# Patient Record
Sex: MALE | Race: WHITE | NOT HISPANIC OR LATINO | Employment: FULL TIME | URBAN - METROPOLITAN AREA
[De-identification: names, ages, dates, MRNs, and addresses within clinical notes are randomized per-mention and may not be internally consistent; named-entity substitution may affect disease eponyms.]

---

## 2021-03-04 LAB — COLOGUARD RESULT REPORTABLE: NEGATIVE

## 2023-04-19 PROBLEM — Z12.5 SCREENING PSA (PROSTATE SPECIFIC ANTIGEN): Status: ACTIVE | Noted: 2023-04-19

## 2023-04-19 PROBLEM — Z12.11 SCREENING FOR MALIGNANT NEOPLASM OF COLON: Status: ACTIVE | Noted: 2023-04-19

## 2023-04-19 PROBLEM — N52.9 ERECTILE DYSFUNCTION: Status: ACTIVE | Noted: 2023-04-19

## 2023-04-19 PROBLEM — Z23 IMMUNIZATION DUE: Status: ACTIVE | Noted: 2023-04-19

## 2023-04-19 PROBLEM — Z00.00 WELLNESS EXAMINATION: Status: ACTIVE | Noted: 2023-04-19

## 2023-04-19 PROBLEM — Z11.59 ENCOUNTER FOR HEPATITIS C SCREENING TEST FOR LOW RISK PATIENT: Status: ACTIVE | Noted: 2023-04-19

## 2023-04-19 PROBLEM — Z11.4 SCREENING FOR HIV (HUMAN IMMUNODEFICIENCY VIRUS): Status: ACTIVE | Noted: 2023-04-19

## 2023-04-19 PROBLEM — R53.83 OTHER FATIGUE: Status: ACTIVE | Noted: 2023-04-19

## 2023-04-25 ENCOUNTER — OFFICE VISIT (OUTPATIENT)
Age: 53
End: 2023-04-25

## 2023-04-25 ENCOUNTER — PATIENT OUTREACH (OUTPATIENT)
Age: 53
End: 2023-04-25

## 2023-04-25 VITALS
HEIGHT: 73 IN | HEART RATE: 75 BPM | DIASTOLIC BLOOD PRESSURE: 88 MMHG | BODY MASS INDEX: 35.25 KG/M2 | SYSTOLIC BLOOD PRESSURE: 136 MMHG | WEIGHT: 266 LBS | OXYGEN SATURATION: 97 %

## 2023-04-25 DIAGNOSIS — I10 ESSENTIAL HYPERTENSION: ICD-10-CM

## 2023-04-25 DIAGNOSIS — E11.9 TYPE 2 DIABETES MELLITUS WITHOUT COMPLICATION, WITHOUT LONG-TERM CURRENT USE OF INSULIN (HCC): Primary | ICD-10-CM

## 2023-04-25 LAB — SL AMB POCT HGB: 6.7

## 2023-04-25 NOTE — ASSESSMENT & PLAN NOTE
Pt here today to follow-up on lab  All labs reviewed with patient, and patient was made aware that his fasting blood glucose was 150  POCT Hgb [id number] is 6 7, which places pt in diabetic range  Pt is apprehensive to start medications, however discussed risks of uncontrolled diabetes with pt, and pt agreeable to start metformin at this time  Discussed use and S/E with pt, including possible GI upset  Pt spoke with complex care manager today  Discussed diabetic diet and exercise with pt  Pt was referred to clinical pharmacist to discuss medication options  Pt should start on a low dose statin, however will address at next apt, do to medication apprehension, if not sooner by clinical pharmacist  Pt verbalized understanding and is in agreement with plan  Follow-up in three months for A1C and microalbumin urine  Discussed importance of diabetic foot exams and diabetic eye exams       Hgb A1c 6 7

## 2023-04-25 NOTE — PROGRESS NOTES
Patient was seen in the office for a follow up visit from his initial visit 4/19 in which he was first referred to care management  Based on his A1c during this visit he was diagnosed with Type II diabetes  I met with the patient in person to review diabetic education with him in regards to nutrition  In order to not overwhelm the patient I started with what he drinks in a typical day  Patient stated he has 20oz of coffee and 32 oz of diet ice tea with about 2 bottles of water  Until next outreach I would like patient to increase water to 5 bottles, cut coffee to 12 oz and tea to 16oz  The goal is to go to 12oz of a caffienated beverage a day and 5 bottles of water  MyChart message sent to patient to open communication portal  Next outreach 5/2

## 2023-04-25 NOTE — PROGRESS NOTES
Name: Gregorio Johnson      : 1970      MRN: [id number]  Encounter Provider: CHRISTOPHER Loera  Encounter Date: 2023   Encounter department: [address] [place] [address] WITH [facility]    Assessment & Plan     1  Type 2 diabetes mellitus without complication, without long-term current use of insulin ([facility] [address] )  Assessment & Plan:  Pt here today to follow-up on lab  All labs reviewed with patient, and patient was made aware that his fasting blood glucose was 150  POCT Hgb [id number] is 6 7, which places pt in diabetic range  Pt is apprehensive to start medications, however discussed risks of uncontrolled diabetes with pt, and pt agreeable to start metformin at this time  Discussed use and S/E with pt, including possible GI upset  Pt spoke with complex care manager today  Discussed diabetic diet and exercise with pt  Pt was referred to clinical pharmacist to discuss medication options  Pt should start on a low dose statin, however will address at next apt, do to medication apprehension, if not sooner by clinical pharmacist  Pt verbalized understanding and is in agreement with plan  Follow-up in three months for A1C and microalbumin urine  Discussed importance of diabetic foot exams and diabetic eye exams  Hgb A1c 6 7    Orders:  -     POCT hemoglobin fingerstick  -     metFORMIN (GLUCOPHAGE) 500 mg tablet; Take 1 tablet (500 mg total) by mouth 2 (two) times a day with meals  -     Ambulatory referral to clinical pharmacy; Future    2  Essential hypertension  Assessment & Plan:  Discussed low salt diet, increasing exercise to 30 mins 3-4x a week  Check home BP and record for next visit  BP goal <140/90  Discussed ER precautions for chest pain, stroke precautions, or BP of 180/120 or greater (hypertensive crisis), change in LOC or worsening symptoms  Call with new or worsening symptoms     If you have numbness, tingling, weakness, or severe headache with elevated BP go to the "ED                 Subjective      Pt is here today to follow-up on labs  Reviewed labs with pt today  Pt was made aware that his fasting blood glucose was 150  Hgb A1C poct done today which showed 6 7  Pt reports he is hesitant to start medications for diabetes  He does note his sister is type 2 diabetic  Pt does admit to poor diet, especially over the winter  Pt would like to work on diet and start riding his bike outdoors now that the weather is nicer  Review of Systems   Constitutional: Negative  HENT: Negative  Eyes: Negative  Respiratory: Negative  Cardiovascular: Negative  Gastrointestinal: Negative  Genitourinary: Negative  Musculoskeletal: Negative  Neurological: Negative  Psychiatric/Behavioral: Negative  Current Outpatient Medications on File Prior to Visit   Medication Sig   • losartan (COZAAR) 100 MG tablet Take 1 tablet (100 mg total) by mouth daily   • [DISCONTINUED] ibuprofen (MOTRIN) 200 mg tablet Take 400 mg by mouth every 6 (six) hours as needed       Objective     /88 (BP Location: Right arm, Patient Position: Sitting, Cuff Size: Large)   Pulse 75   Ht 6' 1\" (1 854 m)   Wt 121 kg (266 lb)   SpO2 97%   BMI 35 09 kg/m²     Physical Exam  Vitals and nursing note reviewed  Constitutional:       General: He is not in acute distress  Appearance: Normal appearance  He is not ill-appearing or toxic-appearing  HENT:      Head: Normocephalic and atraumatic  Right Ear: External ear normal       Left Ear: External ear normal       Nose: Nose normal    Eyes:      General:         Right eye: No discharge  Left eye: No discharge  Conjunctiva/sclera: Conjunctivae normal       Pupils: Pupils are equal, round, and reactive to light  Cardiovascular:      Rate and Rhythm: Normal rate and regular rhythm  Heart sounds: Normal heart sounds  Pulmonary:      Effort: Pulmonary effort is normal       Breath sounds: Normal breath sounds   " Abdominal:      General: Bowel sounds are normal       Palpations: Abdomen is soft  Musculoskeletal:         General: Normal range of motion  Cervical back: Neck supple  Right lower leg: No edema  Left lower leg: No edema  Skin:     General: Skin is warm and dry  Neurological:      Mental Status: He is alert and oriented to person, place, and time     Psychiatric:         Mood and Affect: Mood normal        HaoCHRISTOPHER Crawley

## 2023-04-25 NOTE — ASSESSMENT & PLAN NOTE
Discussed low salt diet, increasing exercise to 30 mins 3-4x a week  Check home BP and record for next visit  BP goal <140/90  Discussed ER precautions for chest pain, stroke precautions, or BP of 180/120 or greater (hypertensive crisis), change in LOC or worsening symptoms  Call with new or worsening symptoms  If you have numbness, tingling, weakness, or severe headache with elevated BP go to the ED

## 2023-04-25 NOTE — PATIENT INSTRUCTIONS
Diabetes and Nutrition   WHAT YOU NEED TO KNOW:   Nutrition plans help keep blood sugar levels steady  They also help delay or prevent complications of diabetes, such as diabetic kidney disease  DISCHARGE INSTRUCTIONS:   Call your local emergency number (911 in the [address]) if:   You have any of the following signs of a heart attack:      Squeezing, pressure, or pain in your chest    You may  also have any of the following:     Discomfort or pain in your back, neck, jaw, stomach, or arm    Shortness of breath    Nausea or vomiting    Lightheadedness or a sudden cold sweat      Seek care immediately if:   You have a low blood sugar level and it does not improve with treatment  Symptoms are trouble thinking, a pounding heartbeat, and sweating  Your blood sugar level is above 240 mg/dL and does not come down within 15 minutes of treatment  You have ketones in your blood or urine  You have nausea or are vomiting and cannot keep any food or liquid down  You have blurred or double vision  Your breath has a fruity, sweet smell, or your breathing is shallow  Call your doctor or diabetes care team if:   Your blood sugar levels are higher than your target goals  You often have low blood sugar levels  You have trouble coping with diabetes, or you feel anxious or depressed  You have questions or concerns about your condition or care  A dietitian will help you create a nutrition plan  to meet your needs and your family's needs  He or she may explain a plan such as the Dietary Approaches to Stop Hypertension (DASH) eating plan or the Mediterranean diet  The goal is for you to reach and maintain healthy weight, blood sugar, blood pressure, and lipid levels  You should meet with the dietitian at least 1 time each year  You will learn the following:   How food affects your blood sugar levels    How to create healthy eating habits    How to make food choices based on your activity level, weight, and glucose levels    How your favorite foods may fit into your plan    How to keep track of carbohydrates    Correct portion sizes for each food    Changes you can make to your plan if you get pregnant or are breastfeeding    What you can do before you meet with the dietitian:   Do not skip meals  The goal is to keep your blood sugar level steady  Blood sugar levels may drop too low if you have received insulin and do not eat  Eat more high-fiber foods  Examples include fresh or frozen fruits and vegetables, whole-grain breads, and beans  Fiber helps control or lower blood sugar and cholesterol levels  Choose whole fruits instead of fruit juice as much as possible  Sugar may be added to juice, and fiber may be removed  Choose heart-healthy fats  Foods high in heart-healthy fats include olive oil, nuts, avocados, and fatty fish, such as salmon and tuna  Foods high in unhealthy fats include red meat, full-fat dairy products, and soft margarine  Unhealthy fats can increase your risk for heart disease, increase bad cholesterol, and lower good cholesterol  Choose complex carbohydrates  Foods with complex carbohydrates include brown rice, whole-grain breads and cereals, and cooked beans  Foods with simple carbohydrates include white bread, white rice, most cold cereals, and snack foods  Your plan will include the amount of carbohydrate to have at one time or in a day  Your blood sugar level can get too high if you eat too much carbohydrate at one time  Blood sugar levels do not spike as high or drop as quickly with complex carbohydrates as with simple carbohydrates  Choose complex carbohydrates whenever possible  Have less sodium (salt)  The risk for high blood pressure (BP) increases with high-sodium foods  Limit high-sodium foods, such as soy sauce, potato chips, and canned soup  Do not add salt to food you cook  Limit your use of table salt   Read labels to have no more than 2,300 milligrams of sodium in one day  Limit artificial sweeteners  These may be found in food or drinks, such as diet soft drinks or other low-calorie beverages  Artificial sweeteners are low in calories  They may help you lower your overall calories and carbohydrates  It is important not to have more calories from other foods to make up for the calories saved  Artificial sweeteners do not have any nutrition  Eat whole foods and drink water as much as possible  Your plan may include beverages with artificial sweeteners for a short time  These can help you transition from high-sugar beverages to water  Use the plate method for each meal   This method can help you eat the right amount of carbohydrates and keep your blood sugar levels under control  Draw an imaginary line down the middle of a 9-inch dinner plate  On one side, draw another line to divide that section in half  Your plate will have one large section and 2 small sections  Fill the largest section with non-starchy vegetables  These include broccoli, spinach, cucumbers, peppers, cauliflower, and tomatoes  Add a starch to one of the small sections  Starches include pasta, rice, whole-grain bread, tortillas, corn, potatoes, and beans  Add meat or another source of protein to the other small section  Examples include chicken or turkey without skin, fish, lean beef or pork, low-fat cheese, tofu, and eggs  Add dairy products or fruit next to your plate if your meal plan allows  Examples of dairy include skim or 1% milk and low-fat yogurt  If you do not drink milk or eat dairy products, you may be able to add another serving of starchy food instead  Have a low-calorie or calorie-free drink with your meal   Examples include water or unsweetened tea or coffee  Reach and maintain a healthy weight:  A healthy weight can help you control your diabetes  You can maintain a healthy weight with a nutrition plan and regular physical activity   Ask your healthcare provider what a healthy weight is for you  Ask him or her to help you create a weight loss plan, if needed  Together you can set weight loss and maintenance goals  For example, your goal may be to lose at least 7% of your extra weight in the first 6 months  What you need to know if you choose to drink alcohol:   Alcohol can cause health problems  Alcohol can cause hypoglycemia (very low blood sugar level), especially if you use insulin  Alcohol can cause high blood sugar and BP levels, and weight gain if you drink too much  Hypoglycemia can happen hours after you drink alcohol  Check your blood sugar level for several hours after you drink alcohol  Have a source of fast-acting carbohydrates with you in case your level goes too low  You need immediate care if you have signs or symptoms of hypoglycemia, such as sweating, confusion, or fainting  Limit alcohol as directed  Your healthcare provider can tell you how many drinks are okay for you within 24 hours or within 1 week  Women 21 years or older and men 72 years or older should limit alcohol to 1 drink a day  Men aged 24 to 59 years should limit alcohol to 2 drinks a day  A drink of alcohol is 12 ounces of beer, 5 ounces of wine, or 1½ ounces of liquor  Always have food when you drink alcohol  Your blood sugar may fall to a low level if you drink when your stomach is empty  Follow up with your doctor or diabetes team as directed:  Write down your questions so you remember to ask them during your visits  © Copyright [employer] 2022 Information is for End User's use only and may not be sold, redistributed or otherwise used for commercial purposes  The above information is an  only  It is not intended as medical advice for individual conditions or treatments  Talk to your doctor, nurse or pharmacist before following any medical regimen to see if it is safe and effective for you    Diabetes and Exercise   WHAT YOU NEED TO KNOW:   Physical activity, such as exercise, can help keep your blood sugar level steady or improve insulin resistance  Activity can help decrease your risk for heart disease, and help you lose weight  Exercise can also help lower your A1c or keep it at goal  Your diabetes care team will help you create an exercise plan  The plan will be based on the type of diabetes you have and your starting fitness level  DISCHARGE INSTRUCTIONS:   Call your local emergency number (911 in the [address] [facility]) if:   You have chest pain or shortness of breath  Return to the emergency department if:   You have a low blood sugar level and it does not improve with treatment  Symptoms are trouble thinking, a pounding heartbeat, and sweating  Your blood sugar level is above 240 mg/dL and does not come down within 15 minutes of treatment  You have blurred or double vision  Your breath has a fruity, sweet smell, or your breathing is shallow  Call your doctor or diabetes care team if:   You have ketones in your blood or urine  You have a fever  Your blood sugar levels are higher than your target goals  You often have low blood sugar levels  Your skin is red, dry, warm, or swollen  You have a wound that does not heal     You have trouble coping with diabetes, or you feel anxious or depressed  You have questions or concerns about your condition or care  Tips to help you create and meet your exercise goals:   Set a goal for 150 minutes (2 5 hours) of moderate to vigorous aerobic activity each week  Aerobic activity helps your heart stay strong  Aerobic activity includes walking, bicycling, dancing, swimming, and raking leaves  Spread aerobic activity over 3 to 5 days  Do not take more than 2 days off in a row  It is best to do at least 10 minutes at a time and 30 minutes each day  You can work up to these goals  Remember that any activity is better than no activity  Over time, you can make exercise more intense or last longer   You can also add more days of exercise as your fitness level improves  Your diabetes care team can help you make a step-by-step plan to achieve your goals  Set a strength training goal of 2 to 3 times a week  Take at least 1 day off in between strength training sessions  Strength training helps you keep the muscles you have and build new muscles  Strength training includes lifting weights, climbing stairs, yoga, and clarissa chi          Older adults should include balance training 2 to 3 times each week  These include walking backwards, standing on one foot, and walking heel to toe in a straight line  Other healthy activity tips:   Stretch before and after you exercise to prevent injury  Drink water or liquids that do not contain sugar before, during, and after exercise  Ask your dietitian or healthcare provider which liquids you should drink when you exercise  Do not  sit for longer than 30 minutes at a time during your day  If you cannot walk around, at least stand up  This will help you stay active and keep your blood circulating  Try to be active throughout your day  Exercise and blood sugar levels:  Check your blood sugar level before and after exercise, if you use insulin  Healthcare providers may tell you to change the amount of insulin you take or food you eat  If your blood sugar level is high, check your blood or urine for ketones before you exercise  Do not exercise if your blood sugar level is high and you have ketones  If your blood sugar level is less than 100 mg/dL, have a carbohydrate snack before you exercise  Examples are 4 to 6 crackers, ½ banana, 8 ounces (1 cup) of milk, or 4 ounces (½ cup) of juice  Follow up with your doctor or diabetes care team as directed:  Write down your questions so you remember to ask them during your visits    © Copyright [facility] 2022 Information is for End User's use only and may not be sold, redistributed or otherwise used for commercial purposes  The above information is an  only  It is not intended as medical advice for individual conditions or treatments  Talk to your doctor, nurse or pharmacist before following any medical regimen to see if it is safe and effective for you

## 2023-04-27 ENCOUNTER — CLINICAL SUPPORT (OUTPATIENT)
Age: 53
End: 2023-04-27

## 2023-04-27 DIAGNOSIS — E11.9 TYPE 2 DIABETES MELLITUS WITHOUT COMPLICATION, WITHOUT LONG-TERM CURRENT USE OF INSULIN (HCC): ICD-10-CM

## 2023-04-27 DIAGNOSIS — E78.2 MIXED HYPERLIPIDEMIA: Primary | ICD-10-CM

## 2023-04-27 RX ORDER — ROSUVASTATIN CALCIUM 5 MG/1
5 TABLET, COATED ORAL DAILY
Qty: 30 TABLET | Refills: 3 | Status: SHIPPED | OUTPATIENT
Start: 2023-04-27

## 2023-04-27 RX ORDER — METFORMIN HYDROCHLORIDE 500 MG/1
1000 TABLET, EXTENDED RELEASE ORAL
Qty: 60 TABLET | Refills: 3 | Status: SHIPPED | OUTPATIENT
Start: 2023-04-27

## 2023-04-27 RX ORDER — BLOOD-GLUCOSE SENSOR
1 EACH MISCELLANEOUS
Qty: 2 EACH | Refills: 11 | Status: SHIPPED | OUTPATIENT
Start: 2023-04-27

## 2023-04-27 NOTE — PROGRESS NOTES
[facility]  Krissy Austin, Pharmacist    Otf Ford is a 47 y o  male who was referred to the pharmacist for T2DM education and management, referred by Melody Hernandez   Patient presents for in person appt today  Assessment/ Plan       1  Type 2 Diabetes:  • Goal A1c <7% based on ADA guidelines however can aim for lower goal of <6 5% based on age and new diagnosis of T2DM  Most recent A1c 6 7% (4/25/23)  • Reported Home SMBG  o Not currently checking  • Complications:  o Microvascular complications: None  o Macrovascular complications: None    Changes to Medication Regimen: If PCP is in agreement with plan  • Initiate moderate intensity statin of rosuvastatin 5 mg daily   • Initiate sabine 3 CGM  Pt declined in person training (his sister has a CGM)  I did have him download Sabine 3 gracia in person today and sent email request to share readings with office  • Switch Metformin  mg BID to Metformin  mg 2 tabs once daily to assist with med adherence with once daily dosing  2  On Additional Therapies:  • Statin: No  • ACEI/ARB: Yes  • ASA: No    3  Health Maintenance:  • Eye Exam: Due  • Foot Exam: Due  • Urine Microalbumin: Due    4  Medication Reconciliation:   • Medication list reviewed with pt at today's visit  • Medication list updated to reflect medications pt is currently taking    Monitoring: Sabine 3 CGM    Referrals placed at this visit: None    Follow-up: 4 weeks      Subjective     1  Medication Adherence/ Tolerability:  • Metformin 500 mg BID- denies side effects  took 3rd pill today  He is having a hard time with taking his second dose due to the timing of his meal and needing to bring medication to work with him  • Supplements: takes OTC Magnesium, turmeric, vitamin D, biotin  Takes Turmeric for inflammation which we discussed possibly stopping due to lack of efficacy   Suggested daily multivitamin instead of the other 3 supplements since that would contain magnesium, audrey D, and biotin  • The patient reports missing 0 doses of medication in the past 2 weeks  Medications Tried in Past for DM:  None     2  Lifestyle:   • Working with care manager on diet and lifestyle interventions  3  Home monitoring devices  • Glucometer: No,   • CGM: No, Brand: getting set up with Sabine 3      Objective     No results found for: HGBA1C    Blood Glucose Readings  The patient is currently checking blood glucose 0 times per day  Patient does not report with SMBG logs        ASCVD Risk:  The 10-year ASCVD risk score (Homer RENAE, et al , 2019) is: 10 4%    Values used to calculate the score:      Age: 47 years      Sex: Male      Is Non- : No      Diabetic: Yes      Tobacco smoker: No      Systolic Blood Pressure: 949 mmHg      Is BP treated: Yes      HDL Cholesterol: 44 mg/dL      Total Cholesterol: 183 mg/dL     Vitals: There were no vitals filed for this visit  Labs:    Lab Results   Component Value Date    SODIUM 136 04/19/2023    K 4 4 04/19/2023    EGFR 110 04/19/2023    CREATININE 0 72 04/19/2023         Pharmacist Tracking Tool     Pharmacist Tracking Tool  Reason For Outreach: Embedded Pharmacist  Demographics:  Intervention Method:  In Person  Type of Intervention: New  Topics Addressed: Diabetes and Dyslipidemia  Pharmacologic Interventions: Medication Initiation, Medication Conversion and Med Rec  Non-Pharmacologic Interventions: Adherence addressed, Disease state education, Home Monitoring, Medication/Device education and Personal CGM  Time:  Direct Patient Care: 45 mins  Care Coordination: 15 mins  Recommendation Recipient: Patient/Caregiver and Provider  Outcome: Accepted

## 2023-05-02 ENCOUNTER — PATIENT OUTREACH (OUTPATIENT)
Age: 53
End: 2023-05-02

## 2023-05-02 ENCOUNTER — TELEPHONE (OUTPATIENT)
Dept: ADMINISTRATIVE | Facility: OTHER | Age: 53
End: 2023-05-02

## 2023-05-02 NOTE — TELEPHONE ENCOUNTER
Upon review of the In Basket request we were able to locate, review, and update the patient chart as requested for CRC: Marcos  Any additional questions or concerns should be emailed to the Practice Liaisons via the appropriate education email address, please do not reply via In Basket      Thank you  Danny Diaz

## 2023-05-02 NOTE — TELEPHONE ENCOUNTER
----- Message from Valentino Rule, [place] sent at 5/1/2023  3:59 PM EDT -----  Regarding: Care Gap request  05/01/23 3:59 PM    Hello, our patient attached above has had CRC: Cologuard completed/performed  Please assist in updating the patient chart by pulling the Care Everywhere (CE) document  The date of service is 2021         Thank you,  Valentino Rule [facility]

## 2023-05-02 NOTE — PROGRESS NOTES
Spoke with patient during scheduled outreach  He is down to 10-12 oz of coffee a day  He has cut juices out, but has a tea a dinner  He is lacking still with water  Patient stated he has no desire to drink and is forcing himself  I explained that the more he drinks the better hydrated he will be and happier his body will feel  He doesn't like tap water and was concerned about the cost of bottled water  I suggested buying a gallon jug or larger to fill the little bottles up  I also suggested getting true lemon or other citrus flavors to add to the water to give it some flavor  Patient stated when he went to grocery he was paying more attention to food labels than previous  I suggested downloading the lose it gracia so that we can see what he is eating and where his daily carbs are falling  He said he has been much more aware of what he is eating since leaving the office  Patient stated he has not gotten notification from pharmacy about [employer] sensor  I suggested he reach out to them and if he doesn't get a response to reach out to clinical pharmacist whom he is working with as well  I provided guidelines for tracking and cutting calories by 500 but not below 1500  Patient verbalized understanding   Next outreach 5/16

## 2023-05-04 LAB — SL AMB POCT HEMOGLOBIN AIC: 6.7 (ref ?–6.5)

## 2023-05-15 ENCOUNTER — TELEPHONE (OUTPATIENT)
Age: 53
End: 2023-05-15

## 2023-05-15 NOTE — TELEPHONE ENCOUNTER
Patient called and left message for clinical pharmacist  States he has been having issues with Sabine 3 sticking  First one fell off shortly after putting it on  Second one was on for ~5 days and knocked it loose  He did call [employer] [facility] and got 2 new replacement sensors  He then put a third one on and did use overbandage as we discussed  Unfortunately he had a phone update at that time which took his operating sytem from  IOS 16 to IOS 16 4 which is no longer compatible  I told patient I would reach [facility] to the [facility] to get more information on the update to the operating system  Awaiting response from rep  Otherwise, of the limited data we have his blood sugar readings did appear well controlled             Pharmacist Tracking Tool  Reason For Outreach: Embedded Pharmacist  Demographics:  Intervention Method: Phone  Type of Intervention: Follow-Up  Topics Addressed: Diabetes  Pharmacologic Interventions: N/A  Non-Pharmacologic Interventions: Care coordination, Chart update and Home Monitoring  Time:  Direct Patient Care: 10 mins  Care Coordination: 5 mins  Recommendation Recipient: N/A  Outcome: N/A

## 2023-05-16 ENCOUNTER — PATIENT OUTREACH (OUTPATIENT)
Age: 53
End: 2023-05-16

## 2023-05-16 NOTE — PROGRESS NOTES
Spoke with patient during scheduled outreach  Patient is drinking 4 bottles a day consistently, and I encouraged him to squeeze 1 more in during the day  He did download the lose it gracia which gave him a starting calorie of 2300 although most days it sounded like he was 2235-5080 with occasional 2100  We talked about the gracia and how it works and why I like to set the goals rather than the gracia  He also has a scale somewhere in the house, but can not locate it at this time  I encouraged him to locate it and weigh on Friday mornings  I set his goal for 4225-0271 for now and we will talk about macros next time  He also stated he has been having some diarrhea from the metformin which the clinical pharmacist is aware of and is switching him to the XR next time  He has been having sensor issues and I recommended using skin prep prior to applying which helps not only provide a barrier, but better adhesion to the skin  He has an issue with the software as well so not sure what is going to happen with that  The information that was received showed that he was in the green zone 96% of the time and therefore the change in diet with metformin seem to be effective at this time   Next outreach 5/31

## 2023-05-25 ENCOUNTER — CLINICAL SUPPORT (OUTPATIENT)
Age: 53
End: 2023-05-25

## 2023-05-25 DIAGNOSIS — E11.9 TYPE 2 DIABETES MELLITUS WITHOUT COMPLICATION, WITHOUT LONG-TERM CURRENT USE OF INSULIN (HCC): Primary | ICD-10-CM

## 2023-05-25 RX ORDER — LANCETS 33 GAUGE
EACH MISCELLANEOUS
Qty: 100 EACH | Refills: 3 | Status: SHIPPED | OUTPATIENT
Start: 2023-05-25

## 2023-05-25 RX ORDER — BLOOD-GLUCOSE METER
KIT MISCELLANEOUS
Qty: 1 KIT | Refills: 0 | Status: SHIPPED | OUTPATIENT
Start: 2023-05-25

## 2023-05-25 RX ORDER — BLOOD SUGAR DIAGNOSTIC
STRIP MISCELLANEOUS
Qty: 100 EACH | Refills: 3 | Status: SHIPPED | OUTPATIENT
Start: 2023-05-25

## 2023-05-25 NOTE — PROGRESS NOTES
"[facility]  Jorge Alberto Armstrong, Pharmacist    Danielle Lopez is a 47 y o  male who was referred to the pharmacist for T2DM education and management, referred by [facility]   Telemedicine consent  The patient was identified by name and date of birth  Danielle Lopez was informed that this is a telemedicine visit and that the visit is being conducted through Telephone  My office door was closed  No one else was in the room  He acknowledged consent and understanding of privacy and security of the video platform  The patient has agreed to participate and understands they can discontinue the visit at any time  Assessment/ Plan       1  Type 2 Diabetes:  • Goal A1c <7% based on ADA guidelines however can aim for lower goal of <6 5% based on age and new diagnosis of T2DM  Most recent A1c 6 7% (4/25/23)  • Sabine 3 Report  o Within goal range of  mg/dL 94% of time (aiming for >70% of time)  o Average blood sugar 113 mg/dL  o Occasional lows on Cherry report however patient is not symptomatic when this happens  I do question if these are true low blood sugar events  He plans to verify with fingerstick  • Complications:  o Microvascular complications: None  o Macrovascular complications: None    Changes to Medication Regimen: If PCP is in agreement with plan  • Continue Metformin 1000 mg total daily  Switch to XR when you run out of IR formulation   • Use up last of Sabine 3 sensors at home then patient plans to stop using CGM  He does want a fingerstick glucometer to have on hand as well  • Rx pended for one touch verio  Other: Patient did ask about statin therapy  States that chiropractor told patient that his cholesterol was only mildly elevated and that statin therapy is overall \"not great\"  Denies any side effects to statin  Denies muscle pain    We did discuss risks of statin versus benefits as well as multiple indication for statin therapy for primary " prevention of cardiovascular disease including hx t2dm, elevated cholesterol levels, and elevated 10 year ASCVD risk  Patient was agreeable to continue statin therapy  2  On Additional Therapies:  • Statin: Yes  • ACEI/ARB: Yes  • ASA: No    3  Health Maintenance:  • Eye Exam: Due  • Foot Exam: Due  • Urine Microalbumin: Due    4  Medication Reconciliation:   • Medication list reviewed with pt at today's visit  • Medication list updated to reflect medications pt is currently taking    Monitoring: Sabine 3 CGM    Referrals placed at this visit: None    Follow-up: 8 weeks with PCP ; as needed with clinical pharmacist       Subjective     1  Medication Adherence/ Tolerability:  • Metformin 500 mg BID- denies side effects  Has a few days of the IR tabs left before switching over to XR tablets  • The patient reports missing 0 doses of medication in the past 2 weeks  Medications Tried in Past for DM:  None     2  Lifestyle:   • Working with care manager on diet and lifestyle interventions  3  Home monitoring devices  • Glucometer: No,   • CGM: No, Brand: getting set up with [facility]      Objective     Lab Results   Component Value Date    HGBA1C 6 7 (A) 05/04/2023       Sabine Report          ASCVD Risk:  The 10-year ASCVD risk score (Homer RENAE, et al , 2019) is: 10 4%    Values used to calculate the score:      Age: 47 years      Sex: Male      Is Non- : No      Diabetic: Yes      Tobacco smoker: No      Systolic Blood Pressure: 378 mmHg      Is BP treated: Yes      HDL Cholesterol: 44 mg/dL      Total Cholesterol: 183 mg/dL     Vitals: There were no vitals filed for this visit      Labs:    Lab Results   Component Value Date    CREATININE 0 72 04/19/2023    EGFR 110 04/19/2023    K 4 4 04/19/2023    SODIUM 136 04/19/2023         Pharmacist Tracking Tool     Pharmacist Tracking Tool  Reason For Outreach: Embedded Pharmacist  Demographics:  Intervention Method: Phone  Type of Intervention: Follow-Up  Topics Addressed: Diabetes and Dyslipidemia  Pharmacologic Interventions: Medication Initiation and Med Rec  Non-Pharmacologic Interventions: Adherence addressed, Disease state education, Home Monitoring, Medication/Device education and Personal CGM  Time:  Direct Patient Care: 20 mins  Care Coordination: 15 mins  Recommendation Recipient: Patient/Caregiver and Provider  Outcome: Accepted

## 2023-05-31 ENCOUNTER — PATIENT OUTREACH (OUTPATIENT)
Dept: FAMILY MEDICINE CLINIC | Facility: CLINIC | Age: 53
End: 2023-05-31

## 2023-05-31 NOTE — PROGRESS NOTES
Spoke with patient during scheduled outreach  Patient has been eating between 8500-6821 calories regularly and has lost 22lbs per his scale  He is in the last hole of his belt loop and clothing is very loose  He is very active in his job with lifting and walking a lot during the day  I asked the patient to look at his macros and his fats are around 40%-50% carbs 30% and protein 20-30%  I Talked with him about the importance of still having enough carbs in the diet even with diabetes  Patient stated that he felt overwhelmed with needing to make an additional change  I asked the patient if he could come in and meet with me to review his food journal so we could talk more about what he is eating and find ways to tweak it  I asked him to stay where he is with calories and we schedule next outreach to be in person 6/12  Patient verbalized understanding

## 2023-06-12 ENCOUNTER — PATIENT OUTREACH (OUTPATIENT)
Age: 53
End: 2023-06-12

## 2023-06-12 NOTE — PROGRESS NOTES
Patient came in the office with me to review his food journal  Patient is typically between 30-50% on fat, 20% protein and 30-50% on carbs  We went through several days of his food tracking and talked about the foods that need adjusting  He is currently eating a lot of red meat, sweets and cheese  They also got a keto pancake mix that is high fat  We talked about still needing carbs in the diet even though he is diabetic and I gave him a book that has an overview of eating as a diabetic  His goal is to go off the metformin and be diet controlled  I suggested if that is his goal that he needs to put more thought into what he is eating in a day  Grab and go won't work right now if he isn't grabbing the right food items  Patient does have an active job and he and his wife have started biking regularly for exercise  Patient said he is having pain in the ball of his foot  I recommended he follow up with a podiatrist which is part of diabetic care as well  CGM only worked for about 2 wks then he was having issues due to the update  Patient verbalized understanding  Nex outreach 7/10 unless he would like to talk before

## 2023-06-18 PROBLEM — Z12.5 SCREENING PSA (PROSTATE SPECIFIC ANTIGEN): Status: RESOLVED | Noted: 2023-04-19 | Resolved: 2023-06-18

## 2023-06-18 PROBLEM — Z12.11 SCREENING FOR MALIGNANT NEOPLASM OF COLON: Status: RESOLVED | Noted: 2023-04-19 | Resolved: 2023-06-18

## 2023-07-07 ENCOUNTER — TELEPHONE (OUTPATIENT)
Age: 53
End: 2023-07-07

## 2023-07-07 DIAGNOSIS — I10 ESSENTIAL HYPERTENSION: ICD-10-CM

## 2023-07-07 DIAGNOSIS — E11.9 TYPE 2 DIABETES MELLITUS WITHOUT COMPLICATION, WITHOUT LONG-TERM CURRENT USE OF INSULIN (HCC): Primary | ICD-10-CM

## 2023-07-07 DIAGNOSIS — E78.2 MIXED HYPERLIPIDEMIA: ICD-10-CM

## 2023-07-07 RX ORDER — BLOOD SUGAR DIAGNOSTIC
STRIP MISCELLANEOUS
Qty: 100 EACH | Refills: 3 | Status: SHIPPED | OUTPATIENT
Start: 2023-07-07

## 2023-07-07 RX ORDER — ROSUVASTATIN CALCIUM 5 MG/1
5 TABLET, COATED ORAL DAILY
Qty: 90 TABLET | Refills: 3 | Status: SHIPPED | OUTPATIENT
Start: 2023-07-07

## 2023-07-07 RX ORDER — METFORMIN HYDROCHLORIDE 500 MG/1
1000 TABLET, EXTENDED RELEASE ORAL
Qty: 180 TABLET | Refills: 3 | Status: SHIPPED | OUTPATIENT
Start: 2023-07-07

## 2023-07-07 RX ORDER — LANCETS 33 GAUGE
EACH MISCELLANEOUS
Qty: 100 EACH | Refills: 3 | Status: SHIPPED | OUTPATIENT
Start: 2023-07-07

## 2023-07-07 RX ORDER — LOSARTAN POTASSIUM 100 MG/1
100 TABLET ORAL DAILY
Qty: 90 TABLET | Refills: 3 | Status: SHIPPED | OUTPATIENT
Start: 2023-07-07

## 2023-07-07 NOTE — TELEPHONE ENCOUNTER
[address]  [place], PharmD, BCPS, BCACP     Covering for Cathie Padron PharmD while out on leave. Received InBasket message from patient. Patient requests that [facility]. Would like 90-day supply of medications. Will pend prescriptions for PCP.

## 2023-07-11 ENCOUNTER — PATIENT OUTREACH (OUTPATIENT)
Age: 53
End: 2023-07-11

## 2023-07-11 NOTE — PROGRESS NOTES
Spoke with patient during scheduled outreach call. He is down 30lbs and is feeling good. He also feels like he is looking good which has boosted his confidence to continue. We talked about making good food choices when eating out. He also mentioned that his children have starting following along eating. His one son who is 24 is all but 400lbs and he is concerned about him. He would like him to come into the office for help, but he wants to lose some weight before he comes in. He also talked to me about food substitutes they have been finding. This is going very smooth as a family. Patient has in office appointment 7/25 at that time I will meet with him and put him into surveillance pending his A1c.

## 2023-07-17 ENCOUNTER — RA CDI HCC (OUTPATIENT)
Dept: OTHER | Facility: HOSPITAL | Age: 53
End: 2023-07-17

## 2023-07-17 NOTE — PROGRESS NOTES
[address] [facility] coding opportunities       Chart reviewed, no opportunity found: CHART REVIEWED, NO OPPORTUNITY FOUND        Patients Insurance        Commercial Insurance: Kamari Pina

## 2023-07-25 ENCOUNTER — OFFICE VISIT (OUTPATIENT)
Age: 53
End: 2023-07-25

## 2023-07-25 VITALS
HEIGHT: 73 IN | DIASTOLIC BLOOD PRESSURE: 72 MMHG | TEMPERATURE: 98.6 F | OXYGEN SATURATION: 95 % | HEART RATE: 57 BPM | BODY MASS INDEX: 31.49 KG/M2 | WEIGHT: 237.6 LBS | SYSTOLIC BLOOD PRESSURE: 128 MMHG

## 2023-07-25 DIAGNOSIS — E66.09 CLASS 1 OBESITY DUE TO EXCESS CALORIES WITHOUT SERIOUS COMORBIDITY WITH BODY MASS INDEX (BMI) OF 30.0 TO 30.9 IN ADULT: ICD-10-CM

## 2023-07-25 DIAGNOSIS — E11.9 TYPE 2 DIABETES MELLITUS WITHOUT COMPLICATION, WITHOUT LONG-TERM CURRENT USE OF INSULIN (HCC): Primary | ICD-10-CM

## 2023-07-25 DIAGNOSIS — I10 ESSENTIAL HYPERTENSION: ICD-10-CM

## 2023-07-25 PROBLEM — Z23 IMMUNIZATION DUE: Status: RESOLVED | Noted: 2023-04-19 | Resolved: 2023-07-25

## 2023-07-25 PROBLEM — Z00.00 WELLNESS EXAMINATION: Status: RESOLVED | Noted: 2023-04-19 | Resolved: 2023-07-25

## 2023-07-25 PROBLEM — Z11.59 ENCOUNTER FOR HEPATITIS C SCREENING TEST FOR LOW RISK PATIENT: Status: RESOLVED | Noted: 2023-04-19 | Resolved: 2023-07-25

## 2023-07-25 PROBLEM — Z11.4 SCREENING FOR HIV (HUMAN IMMUNODEFICIENCY VIRUS): Status: RESOLVED | Noted: 2023-04-19 | Resolved: 2023-07-25

## 2023-07-25 LAB — SL AMB POCT HEMOGLOBIN AIC: 6 (ref ?–6.5)

## 2023-07-25 PROCEDURE — 99213 OFFICE O/P EST LOW 20 MIN: CPT | Performed by: NURSE PRACTITIONER

## 2023-07-25 PROCEDURE — 83036 HEMOGLOBIN GLYCOSYLATED A1C: CPT | Performed by: NURSE PRACTITIONER

## 2023-07-25 NOTE — PATIENT INSTRUCTIONS
Diabetes and Nutrition   AMBULATORY CARE:   Nutrition plans  help keep blood sugar levels steady. They also help delay or prevent complications of diabetes, such as diabetic kidney disease. Call your local emergency number (911 in the [address]) if:   You have any of the following signs of a heart attack:      Squeezing, pressure, or pain in your chest    You may  also have any of the following:     Discomfort or pain in your back, neck, jaw, stomach, or arm    Shortness of breath    Nausea or vomiting    Lightheadedness or a sudden cold sweat      Seek care immediately if:   You have a low blood sugar level and it does not improve with treatment. Symptoms are trouble thinking, a pounding heartbeat, and sweating. Your blood sugar level is above 240 mg/dL and does not come down within 15 minutes of treatment. You have ketones in your blood or urine. You have nausea or are vomiting and cannot keep any food or liquid down. You have blurred or double vision. Your breath has a fruity, sweet smell, or your breathing is shallow. Call your doctor or diabetes care team if:   Your blood sugar levels are higher than your target goals. You often have low blood sugar levels. You have trouble coping with diabetes, or you feel anxious or depressed. You have questions or concerns about your condition or care. A dietitian will help you create a nutrition plan  to meet your needs and your family's needs. He or she may explain a plan such as the Dietary Approaches to Stop Hypertension (DASH) eating plan or the Mediterranean diet. The goal is for you to reach or maintain healthy weight, blood sugar, blood pressure, and lipid levels. You should meet with the dietitian at least 1 time each year. You will learn the following:   How food affects your blood sugar levels    How to create healthy eating habits    How to make food choices based on your activity level, weight, and glucose levels    How your favorite foods may fit into your plan    How to keep track of carbohydrates    Correct portion sizes for each food    Changes you can make to your plan if you get pregnant or are breastfeeding    What you can do before you meet with the dietitian:   Do not skip meals. The goal is to keep your blood sugar level steady. Blood sugar levels may drop too low if you have received insulin and do not eat. Eat more high-fiber foods. Examples include fresh or frozen fruits and vegetables, whole-grain breads, and beans. Fiber helps control or lower blood sugar and cholesterol levels. Choose whole fruits instead of fruit juice as much as possible. Sugar may be added to juice, and fiber may be removed. Choose heart-healthy fats. Foods high in heart-healthy fats include olive oil, nuts, avocados, and fatty fish, such as salmon and tuna. Foods high in unhealthy fats include red meat, full-fat dairy products, and soft margarine. Unhealthy fats can increase your risk for heart disease, increase bad cholesterol, and lower good cholesterol. Choose complex carbohydrates. Foods with complex carbohydrates include brown rice, whole-grain breads and cereals, and cooked beans. Foods with simple carbohydrates include white bread, white rice, most cold cereals, and snack foods. Your plan will include the amount of carbohydrate to have at one time or in a day. Your blood sugar level can get too high if you eat too much carbohydrate at one time. Blood sugar levels do not spike as high or drop as quickly with complex carbohydrates as with simple carbohydrates. Choose complex carbohydrates whenever possible. Have less sodium (salt). The risk for high blood pressure (BP) increases with high-sodium foods. Limit high-sodium foods, such as soy sauce, potato chips, and canned soup. Do not add salt to food you cook. Limit your use of table salt. Read labels to have no more than 2,300 milligrams of sodium in one day.          Limit artificial sweeteners. These may be found in food or drinks, such as diet soft drinks or other low-calorie beverages. Artificial sweeteners are low in calories. They may help you lower your overall calories and carbohydrates. It is important not to have more calories from other foods to make up for the calories saved. Artificial sweeteners do not have any nutrition. Eat whole foods and drink water as much as possible. Your plan may include beverages with artificial sweeteners for a short time. These can help you transition from high-sugar beverages to water. Use the plate method for each meal.  This method can help you eat the right amount of carbohydrates and keep your blood sugar levels under control. Draw an imaginary line down the middle of a 9-inch dinner plate. On one side, draw another line to divide that section in half. Your plate will have one large section and 2 small sections. Fill the largest section with non-starchy vegetables. These include broccoli, spinach, cucumbers, peppers, cauliflower, and tomatoes. Add a starch to one of the small sections. Starches include pasta, rice, whole-grain bread, tortillas, corn, potatoes, and beans. Add meat or another source of protein to the other small section. Examples include chicken or turkey without skin, fish, lean beef or pork, low-fat cheese, tofu, and eggs. Add dairy products or fruit next to your plate if your meal plan allows. Examples of dairy include skim or 1% milk and low-fat yogurt. If you do not drink milk or eat dairy products, you may be able to add another serving of starchy food instead. Have a low-calorie or calorie-free drink with your meal.  Examples include water or unsweetened tea or coffee. Reach and maintain a healthy weight:  A healthy weight can help you control your diabetes. You can maintain a healthy weight with a nutrition plan and regular physical activity.  Ask your healthcare provider what a healthy weight is for you. Ask him or her to help you create a weight loss plan, if needed. Together you can set weight loss and maintenance goals. For example, your goal may be to lose at least 7% of your extra weight in the first 6 months. What you need to know if you choose to drink alcohol:   Alcohol can cause health problems. Alcohol can cause hypoglycemia (very low blood sugar level), especially if you use insulin. Alcohol can cause high blood sugar and BP levels, and weight gain if you drink too much. Hypoglycemia can happen hours after you drink alcohol. Check your blood sugar level for several hours after you drink alcohol. Have a source of fast-acting carbohydrates with you in case your level goes too low. You need immediate care if you have signs or symptoms of hypoglycemia, such as sweating, confusion, or fainting. Limit alcohol as directed. Your healthcare provider can tell you how many drinks are okay for you within 24 hours or within 1 week. Women 21 years or older and men 72 years or older should limit alcohol to 1 drink a day. Men aged 24 to 59 years should limit alcohol to 2 drinks a day. A drink of alcohol is 12 ounces of beer, 5 ounces of wine, or 1½ ounces of liquor. Always have food when you drink alcohol. Your blood sugar may fall to a low level if you drink when your stomach is empty. Follow up with your diabetes team as directed:  Write down your questions so you remember to ask them during your visits. © Copyright Concha Walsh 2022 Information is for End User's use only and may not be sold, redistributed or otherwise used for commercial purposes. The above information is an  only. It is not intended as medical advice for individual conditions or treatments. Talk to your doctor, nurse or pharmacist before following any medical regimen to see if it is safe and effective for you.

## 2023-07-25 NOTE — PROGRESS NOTES
Diabetic Foot Exam    Patient's shoes and socks removed. Right Foot/Ankle   Right Foot Inspection  Skin Exam: skin normal and skin intact. No dry skin, no warmth, no callus, no erythema, no maceration, no abnormal color, no pre-ulcer, no ulcer and no callus. Toe Exam: ROM and strength within normal limits. Sensory   Vibration: intact  Proprioception: intact  Monofilament testing: intact    Vascular  Capillary refills: < 3 seconds  The right DP pulse is 2+. The right PT pulse is 2+. Left Foot/Ankle  Left Foot Inspection  Skin Exam: skin normal and skin intact. No dry skin, no warmth, no erythema, no maceration, normal color, no pre-ulcer, no ulcer and no callus. Toe Exam: ROM and strength within normal limits. Sensory   Vibration: intact  Proprioception: intact  Monofilament testing: intact    Vascular  Capillary refills: < 3 seconds  The left DP pulse is 2+. The left PT pulse is 2+. Assign Risk Category  No deformity present  No loss of protective sensation  No weak pulses  Risk: 0  Name: Carolann Funez      : 1970      MRN: [id number]  Encounter Provider: CHRISTOPHER Israel  Encounter Date: 2023   Encounter department: [address] [place] [address] WITH [facility]    Assessment & Plan     1. Type 2 diabetes mellitus without complication, without long-term current use of insulin ([address])  Assessment & Plan:  Patient doing well on metformin. Patient had foot exam today which was normal.  Advised patient on podiatry for nail trimming. Referral placed for podiatry today. Advised patient on diabetic eye exam.  Patient reports he follows with [facility] and will contact office for diabetic eye exam.  Microalbumin level checked today. Patient reports he has been tracking calories managing his micro and macros. His A1c has dropped from 6.7-6. Patient denies signs of low or high blood sugar. Patient denies side effects of medication.   We will follow-up in 3 months. Lab Results   Component Value Date    HGBA1C 6.0 07/25/2023       Orders:  -     Microalbumin, Random Urine (W/Creatinine) ([facility] ONLY); Future; Expected date: 07/25/2023  -     POCT hemoglobin A1c  -     Microalbumin, Random Urine (W/Creatinine) ([facility] ONLY)  -     Ambulatory Referral to Podiatry; Future    2. Essential hypertension  Assessment & Plan:  Blood pressure well controlled today on losartan. Discussed low salt diet, increasing exercise to 30 mins 3-4x a week. Check home BP and record for next visit. BP goal <140/90. Discussed ER precautions for chest pain, stroke precautions, or BP of 180/120 or greater (hypertensive crisis), change in LOC or worsening symptoms. Call with new or worsening symptoms. If you have numbness, tingling, weakness, or severe headache with elevated BP go to the ED. 3. Class 1 obesity due to excess calories without serious comorbidity with body mass index (BMI) of 30.0 to 30.9 in adult  Assessment & Plan:  Patient has lost approximately 30 pounds with diet and exercise. Patient has been following with complex care manager. Congratulated and encouraged patient on his weight loss. Goal to consume 500 to 1,000 fewer calories per day for a 1-2 lbs weight loss per week. Increase exercise to 30 min, 5 times a week as tolerated for a goal of 150 mins a week. Calorie tracking apps such as myfitness pal can be helpful for keeping track of calorie intake. Decrease simple carbohydrates (white bread, pasta, white rice), and increasing vegetables, fruit, and protein. Increase water. Subjective      Patient here for diabetes f/u. He reports tracking calories, he has lost 30lbs. He has been working out. Subjective    Jammie Martin is a 47 y.o. male who presents for follow up of diabetes. . Current symptoms include: none. Patient denies foot ulcerations, hyperglycemia, hypoglycemia , nausea and visual disturbances.  Evaluation to date has been: fasting blood sugar. Home sugars: BGs consistently in an acceptable range. Current treatments: Metformin, Losartan, and Atorvastatin. Last dilated eye exam: Due at this time. Review of Systems   Constitutional: Negative. HENT: Negative. Eyes: Negative. Respiratory: Negative. Cardiovascular: Negative. Gastrointestinal: Negative. Genitourinary: Negative. Musculoskeletal: Negative. Skin: Negative. Neurological: Negative. Psychiatric/Behavioral: Negative. Current Outpatient Medications on File Prior to Visit   Medication Sig   • Blood Glucose Monitoring Suppl (OneTouch Verio Reflect) w/Device KIT Check blood sugars once daily. Please substitute with appropriate alternative as covered by patient's insurance. Dx: E11.65   • Continuous Blood Gluc Sensor (FreeStyle Sabine 3 Sensor) MISC Use 1 each every 14 (fourteen) days   • glucose blood (OneTouch Verio) test strip Check blood sugars once daily. Please substitute with appropriate alternative as covered by patient's insurance. Dx: E11.65   • losartan (COZAAR) 100 MG tablet Take 1 tablet (100 mg total) by mouth daily   • metFORMIN (GLUCOPHAGE-XR) 500 mg 24 hr tablet Take 2 tablets (1,000 mg total) by mouth daily with breakfast   • OneTouch Delica Lancets 92B MISC Check blood sugars once daily. Please substitute with appropriate alternative as covered by patient's insurance. Dx: E11.65   • rosuvastatin (CRESTOR) 5 mg tablet Take 1 tablet (5 mg total) by mouth daily       Objective     /72 (BP Location: Right leg, Patient Position: Sitting, Cuff Size: Large)   Pulse 57   Temp 98.6 °F (37 °C)   Ht 6' 1" (1.854 m)   Wt 108 kg (237 lb 9.6 oz)   SpO2 95%   BMI 31.35 kg/m²     Physical Exam  Vitals and nursing note reviewed. Constitutional:       General: He is not in acute distress. Appearance: Normal appearance. He is not ill-appearing or toxic-appearing. HENT:      Head: Normocephalic and atraumatic.       Right Ear: External ear normal.      Left Ear: External ear normal.      Nose: Nose normal.   Eyes:      General:         Right eye: No discharge. Left eye: No discharge. Conjunctiva/sclera: Conjunctivae normal.      Pupils: Pupils are equal, round, and reactive to light. Cardiovascular:      Rate and Rhythm: Normal rate and regular rhythm. Pulses: no weak pulses          Dorsalis pedis pulses are 2+ on the right side and 2+ on the left side. Posterior tibial pulses are 2+ on the right side and 2+ on the left side. Heart sounds: Normal heart sounds. Pulmonary:      Effort: Pulmonary effort is normal.      Breath sounds: Normal breath sounds. Abdominal:      General: Bowel sounds are normal.      Palpations: Abdomen is soft. Musculoskeletal:         General: Normal range of motion. Cervical back: Neck supple. Right lower leg: No edema. Left lower leg: No edema. Feet:      Right foot:      Skin integrity: No ulcer, skin breakdown, erythema, warmth, callus or dry skin. Left foot:      Skin integrity: No ulcer, skin breakdown, erythema, warmth, callus or dry skin. Skin:     General: Skin is warm and dry. Neurological:      Mental Status: He is alert and oriented to person, place, and time. Psychiatric:         Mood and Affect: Mood normal.         Behavior: Behavior normal.         Thought Content:  Thought content normal.         Judgment: Judgment normal.       CHRISTOPHER Veliz

## 2023-07-25 NOTE — ASSESSMENT & PLAN NOTE
Patient has lost approximately 30 pounds with diet and exercise. Patient has been following with complex care manager. Congratulated and encouraged patient on his weight loss. Goal to consume 500 to 1,000 fewer calories per day for a 1-2 lbs weight loss per week. Increase exercise to 30 min, 5 times a week as tolerated for a goal of 150 mins a week. Calorie tracking apps such as myfitness pal can be helpful for keeping track of calorie intake. Decrease simple carbohydrates (white bread, pasta, white rice), and increasing vegetables, fruit, and protein. Increase water.

## 2023-07-25 NOTE — ASSESSMENT & PLAN NOTE
Patient doing well on metformin. Patient had foot exam today which was normal.  Advised patient on podiatry for nail trimming. Referral placed for podiatry today. Advised patient on diabetic eye exam.  Patient reports he follows with [facility] and will contact office for diabetic eye exam.  Microalbumin level checked today. Patient reports he has been tracking calories managing his micro and macros. His A1c has dropped from 6.7-6. Patient denies signs of low or high blood sugar. Patient denies side effects of medication. We will follow-up in 3 months.   Lab Results   Component Value Date    HGBA1C 6.0 07/25/2023

## 2023-07-25 NOTE — ASSESSMENT & PLAN NOTE
Blood pressure well controlled today on losartan. Discussed low salt diet, increasing exercise to 30 mins 3-4x a week. Check home BP and record for next visit. BP goal <140/90. Discussed ER precautions for chest pain, stroke precautions, or BP of 180/120 or greater (hypertensive crisis), change in LOC or worsening symptoms. Call with new or worsening symptoms. If you have numbness, tingling, weakness, or severe headache with elevated BP go to the ED.

## 2023-07-26 LAB
ALBUMIN/CREAT UR: 4 MCG/MG CREAT
CREAT UR-MCNC: 96 MG/DL (ref 20–320)
MICROALBUMIN UR-MCNC: 0.4 MG/DL

## 2023-07-27 ENCOUNTER — PATIENT OUTREACH (OUTPATIENT)
Dept: FAMILY MEDICINE CLINIC | Facility: CLINIC | Age: 53
End: 2023-07-27

## 2023-07-27 NOTE — PROGRESS NOTES
Patient was in office on Tuesday for a follow up, but due to a change in schedule I was unable to meet with him in person. His A1c is down to 6 and he has lost 32lbs. I reached out to congratulate him on his success. He is feeling good and his weight is fluctuating a little bit with in a couple pounds. He is still tracking since he would like to lose a little more weight. I encouraged him to keep up the good work and explained plateaus are just the bodys way of rebalancing, but that if he perseveres the weight loss should continue. I also let him know there are other medications that could help push farther if he is ever interested. Patient moved to surveillance. Will reach out through [employer] in 2 mths.

## 2023-10-03 ENCOUNTER — TELEPHONE (OUTPATIENT)
Dept: ADMINISTRATIVE | Facility: OTHER | Age: 53
End: 2023-10-03

## 2023-10-03 NOTE — TELEPHONE ENCOUNTER
----- Message from Edward Ellison sent at 10/3/2023 10:43 AM EDT -----  Regarding: care gap request  01/04/23 1:48 PM    Hello, our patient attached above Diabetic Eye Exam completed/performed. Please assist in updating the patient chart by pulling the document from the Media Tab. The date of service is 9/9/23.      Thank you,  Edward Ellison  [address] [place] [address]

## 2023-10-03 NOTE — TELEPHONE ENCOUNTER
----- Message from Oswaldo Blount, [facility] sent at 10/3/2023  9:32 AM EDT -----  Regarding: Care gap update  10/03/23 9:32 AM    Hello, our patient Deirdre Agudelo has had Diabetic Foot Exam completed/performed. Please assist in updating the patient chart by pulling the document from Encounter Tab within Chart Review. The date of service is 09/29/2023.      Thank you,  Oswaldo Blount  [facility]

## 2023-10-03 NOTE — TELEPHONE ENCOUNTER
Upon review of the In Basket request we have found/obtained the documentation. After careful review of the document we are unable to complete this request for Diabetic Eye Exam because the documentation does not have the proper verbiage (wording) needed to close the requested care gap(s). Eye exam report from 2/3/2023 does not state whether retinopathy is present or not. There is no DM Foot Exam dated 9/2023 in encounters or media, patient had a DM Foot Exam complete 7/2023 which has previously been linked to [facility]. Any additional questions or concerns should be emailed to the Practice Liaisons via the appropriate education email address, please do not reply via In Basket.     Thank you  Sanket Murray

## 2023-10-04 ENCOUNTER — TELEPHONE (OUTPATIENT)
Dept: FAMILY MEDICINE CLINIC | Facility: CLINIC | Age: 53
End: 2023-10-04

## 2023-10-04 NOTE — TELEPHONE ENCOUNTER
Cami Soni from [facility] called in regards to my request for the Diabetic Eye Exam.  They weren't aware the patient had Diabetes. They initially sent over his eye exam results but Quality could not link it bc it was resulting whether or not the patient was with "Retinopathy." She is faxing over paperwork for that now.

## 2023-10-25 ENCOUNTER — OFFICE VISIT (OUTPATIENT)
Age: 53
End: 2023-10-25

## 2023-10-25 VITALS
OXYGEN SATURATION: 97 % | WEIGHT: 226 LBS | TEMPERATURE: 98.3 F | HEIGHT: 73 IN | DIASTOLIC BLOOD PRESSURE: 68 MMHG | BODY MASS INDEX: 29.95 KG/M2 | HEART RATE: 69 BPM | SYSTOLIC BLOOD PRESSURE: 120 MMHG

## 2023-10-25 DIAGNOSIS — E78.2 MODERATE MIXED HYPERLIPIDEMIA NOT REQUIRING STATIN THERAPY: ICD-10-CM

## 2023-10-25 DIAGNOSIS — E66.3 OVERWEIGHT (BMI 25.0-29.9): ICD-10-CM

## 2023-10-25 DIAGNOSIS — I10 ESSENTIAL HYPERTENSION: ICD-10-CM

## 2023-10-25 DIAGNOSIS — E11.9 TYPE 2 DIABETES MELLITUS WITHOUT COMPLICATION, WITHOUT LONG-TERM CURRENT USE OF INSULIN (HCC): Primary | ICD-10-CM

## 2023-10-25 PROCEDURE — 99214 OFFICE O/P EST MOD 30 MIN: CPT | Performed by: NURSE PRACTITIONER

## 2023-10-25 NOTE — ASSESSMENT & PLAN NOTE
Blood pressure well controlled on Losartan 100mg. Discussed low salt diet, increasing exercise to 30 mins 3-4x a week. Check home BP and record for next visit. BP goal <140/90. Discussed ER precautions for chest pain, stroke precautions, or BP of 180/120 or greater (hypertensive crisis), change in LOC or worsening symptoms. Call with new or worsening symptoms. If you have numbness, tingling, weakness, or severe headache with elevated BP go to the ED.

## 2023-10-25 NOTE — PATIENT INSTRUCTIONS
Hypertension   WHAT YOU NEED TO KNOW:   Hypertension is high blood pressure. Your blood pressure is the force of your blood moving against the walls of your arteries. Hypertension causes your blood pressure to get so high that your heart has to work much harder than normal. This can damage your heart. Hypertension that does not respond to medicines and lifestyle changes is called resistant hypertension. Hypertension is considered chronic when it continues for 3 months or longer. DISCHARGE INSTRUCTIONS:   Call your local emergency number (911 in the [address]) or have someone call if:   You have chest pain. You have any of the following signs of a heart attack:      Squeezing, pressure, or pain in your chest    You may  also have any of the following:     Discomfort or pain in your back, neck, jaw, stomach, or arm    Shortness of breath    Nausea or vomiting    Lightheadedness or a sudden cold sweat    You become confused or have trouble speaking. You suddenly feel lightheaded or have trouble breathing. Return to the emergency department if:   You have a severe headache or vision loss. You have weakness in an arm or leg. Call your doctor or cardiologist if:   You feel faint, dizzy, confused, or drowsy. You have been taking your blood pressure medicine but your pressure is higher than your provider says it should be. You have questions or concerns about your condition or care. Medicines: You may  need any of the following:  Antihypertensives  may be used to help lower your blood pressure. Several kinds of medicines are available. Your healthcare provider will choose medicines based on the kind of hypertension you have. You may need more than one type of medicine. Take the medicine exactly as directed. Diuretics  help decrease extra fluid that collects in your body. This will help lower your blood pressure. You may urinate more often while you take this medicine.     Cholesterol medicine  helps lower your cholesterol level. A low cholesterol level helps prevent heart disease and makes it easier to control your blood pressure. Take your medicine as directed. Contact your healthcare provider if you think your medicine is not helping or if you have side effects. Tell your provider if you are allergic to any medicine. Keep a list of the medicines, vitamins, and herbs you take. Include the amounts, and when and why you take them. Bring the list or the pill bottles to follow-up visits. Carry your medicine list with you in case of an emergency. Follow up with your doctor or cardiologist as directed: You will need to return to have your blood pressure checked and to have other lab tests done. Write down your questions so you remember to ask them during your visits. Stages of hypertension:  Your healthcare provider will give you a blood pressure goal based on your age, health, and risk for cardiovascular disease. The following are general guidelines on the stages of hypertension:  Normal blood pressure is 119/79 or lower . Your healthcare provider may only check your blood pressure each year if it stays at a normal level. Elevated blood pressure is 120/79 to 129/79 . This is sometimes called prehypertension. Your healthcare provider may suggest lifestyle changes to help lower your blood pressure to a normal level. He or she may then check it again in 3 to 6 months. Stage 1 hypertension is 130/80  to 139/89 . Your provider may recommend lifestyle changes, medication, and checks every 3 to 6 months until your blood pressure is controlled. Stage 2 hypertension is 140/90 or higher . Your provider will recommend lifestyle changes and have you take 2 kinds of hypertension medicines. You will also need to have your blood pressure checked monthly until it is controlled. Manage hypertension:   Check your blood pressure at home.   Do not smoke, have caffeine, or exercise for at least 30 minutes before you check your blood pressure. Sit and rest for 5 minutes before you check your blood pressure. Extend your arm and support it on a flat surface. Your arm should be at the same level as your heart. Follow the directions that came with your blood pressure monitor. Check your blood pressure 2 times, 1 minute apart, before you take your medicine in the morning. Also check your blood pressure before your evening meal. Keep a record of your readings and bring it to your follow-up visits. Ask your healthcare provider what your blood pressure should be. Manage any other health conditions you have. Health conditions such as diabetes can increase your risk for hypertension. Follow your healthcare provider's instructions and take all your medicines as directed. Ask about all medicines. Certain medicines can increase your blood pressure. Examples include oral birth control pills, decongestants, herbal supplements, and NSAIDs, such as ibuprofen. Your healthcare provider can tell you which medicines are safe for you to take. This includes prescription and over-the-counter medicines. Lifestyle changes you can make to manage hypertension:  Your healthcare provider may recommend you work with a team to manage hypertension. The team may include medical experts such as a dietitian, an exercise or physical therapist, and a behavior therapist. Your family members may be included in helping you create lifestyle changes. Limit sodium (salt) as directed. Too much sodium can affect your fluid balance. Check labels to find low-sodium or no-salt-added foods. Some low-sodium foods use potassium salts for flavor. Too much potassium can also cause health problems. Your healthcare provider will tell you how much sodium and potassium are safe for you to have in a day. He or she may recommend that you limit sodium to 2,300 mg a day. Follow the meal plan recommended by your healthcare provider.   A dietitian or your provider can give you more information on low-sodium plans or the DASH (Dietary Approaches to Stop Hypertension) eating plan. The DASH plan is low in sodium, processed sugar, unhealthy fats, and total fat. It is high in potassium, calcium, and fiber. These can be found in vegetables, fruit, and whole-grain foods. Be physically active throughout the day. Physical activity, such as exercise, can help control your blood pressure and your weight. Be physically active for at least 30 minutes per day, on most days of the week. Include aerobic activity, such as walking or riding a bicycle. Also include strength training at least 2 times each week. Your healthcare providers can help you create a physical activity plan. Decrease stress. This may help lower your blood pressure. Learn ways to relax, such as deep breathing or listening to music. Limit alcohol as directed. Alcohol can increase your blood pressure. A drink of alcohol is 12 ounces of beer, 5 ounces of wine, or 1½ ounces of liquor. Do not smoke. Nicotine and other chemicals in cigarettes and cigars can increase your blood pressure and also cause lung damage. Ask your healthcare provider for information if you currently smoke and need help to quit. E-cigarettes or smokeless tobacco still contain nicotine. Talk to your healthcare provider before you use these products. © Copyright [employer]ann [employer] 2023 Information is for End User's use only and may not be sold, redistributed or otherwise used for commercial purposes. The above information is an  only. It is not intended as medical advice for individual conditions or treatments. Talk to your doctor, nurse or pharmacist before following any medical regimen to see if it is safe and effective for you. Diabetes and Nutrition   WHAT YOU NEED TO KNOW:   Nutrition plans help keep blood sugar levels steady.  They also help delay or prevent complications of diabetes, such as diabetic kidney disease. DISCHARGE INSTRUCTIONS:   Call your local emergency number (911 in the [address]) if:   You have any of the following signs of a heart attack:      Squeezing, pressure, or pain in your chest    You may  also have any of the following:     Discomfort or pain in your back, neck, jaw, stomach, or arm    Shortness of breath    Nausea or vomiting    Lightheadedness or a sudden cold sweat      Return to the emergency department if:   You have a low blood sugar level and it does not improve with treatment. Symptoms are trouble thinking, a pounding heartbeat, and sweating. Your blood sugar level is above 240 mg/dL and does not come down within 15 minutes of treatment. You have ketones in your blood or urine. You have nausea or are vomiting and cannot keep any food or liquid down. You have blurred or double vision. Your breath has a fruity, sweet smell, or your breathing is shallow. Call your doctor or diabetes care team provider if:   Your blood sugar levels are higher than your target goals. You often have low blood sugar levels. You have trouble coping with diabetes, or you feel anxious or depressed. You have questions or concerns about your condition or care. A dietitian will help you create a nutrition plan  to meet your needs and your family's needs. Your dietitian may explain a plan such as the Dietary Approaches to Stop Hypertension (DASH) eating plan or the Mediterranean diet. The goal is for you to reach or maintain healthy weight, blood sugar, blood pressure, and lipid levels. You should meet with the dietitian at least 1 time each year. You will learn the following:   How food affects your blood sugar levels    How to create healthy eating habits    How to make food choices based on your activity level, weight, and glucose levels    How your favorite foods may fit into your plan    How to keep track of carbohydrates    Correct portion sizes for each food    Changes you can make to your plan if you get pregnant or are breastfeeding       What you can do before you meet with the dietitian:   Do not skip meals. The goal is to keep your blood sugar level steady. Blood sugar levels may drop too low if you have received insulin and do not eat. Eat more high-fiber foods. Examples include fresh or frozen fruits and vegetables, whole-grain breads, and beans. Fiber helps control or lower blood sugar and cholesterol levels. Choose whole fruits instead of fruit juice as much as possible. Sugar may be added to juice, and fiber may be removed. Choose heart-healthy fats. Foods high in heart-healthy fats include olive oil, nuts, avocados, and fatty fish, such as salmon and tuna. Foods high in unhealthy fats include red meat, full-fat dairy products, and soft margarine. Unhealthy fats can increase your risk for heart disease, increase bad cholesterol, and lower good cholesterol. Choose complex carbohydrates. Foods with complex carbohydrates include brown rice, whole-grain breads and cereals, and cooked beans. Foods with simple carbohydrates include white bread, white rice, most cold cereals, and snack foods. Your plan will include the amount of carbohydrate to have at one time or in a day. Your blood sugar level can get too high if you eat too much carbohydrate at one time. Blood sugar levels do not spike as high or drop as quickly with complex carbohydrates as with simple carbohydrates. Choose complex carbohydrates whenever possible. Have less sodium (salt). The risk for high blood pressure (BP) increases with high-sodium foods. Limit high-sodium foods, such as soy sauce, potato chips, and canned soup. Do not add salt to food you cook. Limit your use of table salt. Read labels to have no more than 2,300 milligrams of sodium in one day. Limit artificial sweeteners. These may be found in food or drinks, such as diet soft drinks or other low-calorie beverages. Artificial sweeteners are low in calories. They may help you lower your overall calories and carbohydrates. It is important not to have more calories from other foods to make up for the calories saved. Artificial sweeteners do not have any nutrition. Eat whole foods and drink water as much as possible. Your plan may include beverages with artificial sweeteners for a short time. These can help you transition from high-sugar beverages to water. Use the plate method for each meal.  This method can help you eat the right amount of carbohydrates and keep your blood sugar levels under control. Draw an imaginary line down the middle of a 9-inch dinner plate. On one side, draw another line to divide that section in half. Your plate will have one large section and 2 small sections. Fill the largest section with non-starchy vegetables. These include broccoli, spinach, cucumbers, peppers, cauliflower, and tomatoes. Add a carbohydrate to one of the small sections. Examples include pasta, rice, whole-grain bread, tortillas, corn, potatoes, and beans. Your plan may allow a serving of low-fat dairy or a small fruit as a carbohydrate. Add meat or another source of protein to the other small section. Examples include chicken or turkey without skin, fish, lean beef or pork, low-fat cheese, tofu, and eggs. Have a low-calorie or calorie-free drink with your meal.  Examples include water or unsweetened tea or coffee. Reach and maintain a healthy weight:  A healthy weight can help you control your diabetes. Ask your healthcare provider what a healthy weight is for you. Even a weight loss of 3% to 7% of excess body weight can help you manage diabetes. Your provider can help you create a weight-loss plan, if needed. For example, your goal may be to lose at least 5% of your extra weight in the first 6 months. What you need to know if you choose to drink alcohol:   Alcohol can cause health problems.   Alcohol can cause hypoglycemia (very low blood sugar level), especially if you use insulin. Alcohol can cause high blood sugar and BP levels, and weight gain if you drink too much. Hypoglycemia can happen hours after you drink alcohol. Check your blood sugar level for several hours after you drink alcohol. Have a source of fast-acting carbohydrates with you in case your level goes too low. You need immediate care if you have signs or symptoms of hypoglycemia, such as sweating, confusion, or fainting. Limit alcohol as directed. Your healthcare provider can tell you how many drinks are okay for you within 24 hours or within 1 week. Women 21 years or older and men 72 years or older should limit alcohol to 1 drink a day. Men aged 24 to 59 years should limit alcohol to 2 drinks a day. A drink of alcohol is 12 ounces of beer, 5 ounces of wine, or 1½ ounces of liquor. Always have food when you drink alcohol. Your blood sugar may fall to a low level if you drink when your stomach is empty. Follow up with your doctor or diabetes team provider as directed: Your doctor or provider may recommend counseling to help you make nutrition changes. Write down your questions so you remember to ask them during your visits. © Copyright [facility] 2023 Information is for End User's use only and may not be sold, redistributed or otherwise used for commercial purposes. The above information is an  only. It is not intended as medical advice for individual conditions or treatments. Talk to your doctor, nurse or pharmacist before following any medical regimen to see if it is safe and effective for you.

## 2023-10-25 NOTE — ASSESSMENT & PLAN NOTE
Discussed adopting a low fat, low cholesterol diet. Adding Omega-3 supplements. Discussed setting exercise goal of 3-4 times a week for 30 mins a day.

## 2023-10-25 NOTE — ASSESSMENT & PLAN NOTE
Patient has lost 11 pounds since his last visit. Patient has been doing an excellent job with diet, weight loss, exercise. Goal to consume 500 to 1,000 fewer calories per day for a 1-2 lbs weight loss per week. Increase exercise to 30 min, 5 times a week as tolerated for a goal of 150 mins a week. Calorie tracking apps such as myfitness pal can be helpful for keeping track of calorie intake. Decrease simple carbohydrates (white bread, pasta, white rice), and increasing vegetables, fruit, and protein. Increase water.

## 2023-10-25 NOTE — PROGRESS NOTES
Name: Jill Burton      : 1970      MRN: [id number]  Encounter Provider: CHRISTOPHER Tidwell  Encounter Date: 10/25/2023   Encounter department: [address] [place] [address] WITH [facility]    Assessment & Plan     1. Type 2 diabetes mellitus without complication, without long-term current use of insulin ([address])  Assessment & Plan:  Patient is doing well on metformin. He recently followed up with podiatry for foot exam.  Patient did have an eye exam, however it was not a diabetic eye exam so he was referred back to [facility] today. Patient has been doing an excellent job on diet and weight loss. Lab Results   Component Value Date    HGBA1C 6.0 2023       Orders:  -     Ambulatory referral to Ophthalmology; Future; Expected date: 10/25/2023    2. Essential hypertension  Assessment & Plan:  Blood pressure well controlled on Losartan 100mg. Discussed low salt diet, increasing exercise to 30 mins 3-4x a week. Check home BP and record for next visit. BP goal <140/90. Discussed ER precautions for chest pain, stroke precautions, or BP of 180/120 or greater (hypertensive crisis), change in LOC or worsening symptoms. Call with new or worsening symptoms. If you have numbness, tingling, weakness, or severe headache with elevated BP go to the ED. 3. Moderate mixed hyperlipidemia not requiring statin therapy  Assessment & Plan:  Discussed adopting a low fat, low cholesterol diet. Adding Omega-3 supplements. Discussed setting exercise goal of 3-4 times a week for 30 mins a day. 4. Overweight (BMI 25.0-29. 9)  Assessment & Plan:  Patient has lost 11 pounds since his last visit. Patient has been doing an excellent job with diet, weight loss, exercise. Goal to consume 500 to 1,000 fewer calories per day for a 1-2 lbs weight loss per week. Increase exercise to 30 min, 5 times a week as tolerated for a goal of 150 mins a week.  Calorie tracking apps such as [facility] pal can be helpful for keeping track of calorie intake. Decrease simple carbohydrates (white bread, pasta, white rice), and increasing vegetables, fruit, and protein. Increase water. Depression Screening and Follow-up Plan: Patient was screened for depression during today's encounter. They screened negative with a PHQ-2 score of 0. 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 male who presents for follow-up of Type 2 diabetes mellitus. Current symptoms/problems include none and have been improving. Known diabetic complications: none  Cardiovascular risk factors: HTN, DM, HLD  Current diabetic medications include oral agent (monotherapy): metformin. Eye exam current (within one year): no  Weight trend: decreasing steadily  Prior visit with dietician:  Complex care nurse and Clinical pharm  Current diet: in general, a "healthy" diet    Current exercise: walking      Lab Review  Sodium (mmol/L)       Date                     Value                 04/19/2023               136              ----------  CO2 (mmol/L)       Date                     Value                 04/19/2023               27               ----------  BUN (mg/dL)       Date                     Value                 04/19/2023               12               ----------  Creatinine (mg/dL)       Date                     Value                 04/19/2023               0.72             ----------    Patient here for follow-up of elevated blood pressure. He is exercising and is adherent to a low-salt diet. Blood pressure is not well checking at home. Cardiac symptoms: none. Patient denies: chest pain, fatigue, orthopnea, palpitations, syncope, and tachypnea. Cardiovascular risk factors: diabetes mellitus, hypertension, male gender, and obesity (BMI >= 30 kg/m2). Use of agents associated with hypertension: none. History of target organ damage: none. Review of Systems   Constitutional: Negative. HENT: Negative. Eyes: Negative. Respiratory: Negative. Cardiovascular: Negative. Gastrointestinal: Negative. Endocrine: Negative. Genitourinary: Negative. Musculoskeletal: Negative. Skin: Negative. Allergic/Immunologic: Negative. Neurological: Negative. Hematological: Negative. Psychiatric/Behavioral: Negative. Current Outpatient Medications on File Prior to Visit   Medication Sig   • Blood Glucose Monitoring Suppl (OneTouch Verio Reflect) w/Device KIT Check blood sugars once daily. Please substitute with appropriate alternative as covered by patient's insurance. Dx: E11.65   • Continuous Blood Gluc Sensor (FreeStyle Sabine 3 Sensor) [facility] Use 1 each every 14 (fourteen) days   • glucose blood (OneTouch Verio) test strip Check blood sugars once daily. Please substitute with appropriate alternative as covered by patient's insurance. Dx: E11.65   • losartan (COZAAR) 100 MG tablet Take 1 tablet (100 mg total) by mouth daily   • metFORMIN (GLUCOPHAGE-XR) 500 mg 24 hr tablet Take 2 tablets (1,000 mg total) by mouth daily with breakfast   • OneTouch Delica Lancets 07I MISC Check blood sugars once daily. Please substitute with appropriate alternative as covered by patient's insurance. Dx: E11.65   • rosuvastatin (CRESTOR) 5 mg tablet Take 1 tablet (5 mg total) by mouth daily       Objective     /68 (BP Location: Left arm, Patient Position: Sitting, Cuff Size: Standard)   Pulse 69   Temp 98.3 °F (36.8 °C)   Ht 6' 1" (1.854 m)   Wt 103 kg (226 lb)   SpO2 97%   BMI 29.82 kg/m²     Physical Exam  Vitals and nursing note reviewed. Constitutional:       General: He is not in acute distress. Appearance: Normal appearance. He is not ill-appearing or toxic-appearing. HENT:      Head: Normocephalic and atraumatic. Right Ear: External ear normal.      Left Ear: External ear normal.      Nose: Nose normal.   Eyes:      General:         Right eye: No discharge.          Left eye: No discharge. Conjunctiva/sclera: Conjunctivae normal.      Pupils: Pupils are equal, round, and reactive to light. Cardiovascular:      Rate and Rhythm: Normal rate and regular rhythm. Heart sounds: Normal heart sounds. Pulmonary:      Effort: Pulmonary effort is normal.      Breath sounds: Normal breath sounds. Abdominal:      General: Bowel sounds are normal.      Palpations: Abdomen is soft. Musculoskeletal:         General: Normal range of motion. Cervical back: Neck supple. Right lower leg: No edema. Left lower leg: No edema. Skin:     General: Skin is warm and dry. Neurological:      Mental Status: He is alert and oriented to person, place, and time.    Psychiatric:         Mood and Affect: Mood normal.       CHRISTOPHER Juares

## 2023-11-08 ENCOUNTER — PATIENT OUTREACH (OUTPATIENT)
Dept: FAMILY MEDICINE CLINIC | Facility: CLINIC | Age: 53
End: 2023-11-08

## 2023-11-08 NOTE — PROGRESS NOTES
Chart review. Patient was in office 10/25 for last DM follow up visit. He is doing fantastic with keeping DM controlled on metformin and continuing to lose weight. Asked patient if he would like to schedule an outreach call.  [employer]oo notification sent as well

## 2023-11-20 ENCOUNTER — PATIENT OUTREACH (OUTPATIENT)
Age: 53
End: 2023-11-20

## 2023-11-20 NOTE — PROGRESS NOTES
Patient sent [employer] message about scheduling an appointment. Response sent with dates and time. Will await a response.

## 2023-12-06 ENCOUNTER — PATIENT OUTREACH (OUTPATIENT)
Dept: FAMILY MEDICINE CLINIC | Facility: CLINIC | Age: 53
End: 2023-12-06

## 2023-12-06 NOTE — PROGRESS NOTES
Called patient during scheduled outreach time. No answer, voicemail left with callback information. My Chart message sent with response request for best day and time to reschedule outreach. Will reach out again if no response. Patient returned call he has been doing well overall. He is still tracking calories. He mentioned that he has bouts where he feels weak. I asked if he checked his blood glucose and he said he can not get the device to espinosa his skin. I reviewed education on how to obtain a blood sample. This is different than what he was trying. Patient was going to try method as discussed and get back to me. His last A1c was 6 in July however he has lost 20 more lbs since then. He is not due for next A1c until February. Reviewed symptoms of hypoglycemia which is not common with metformin, but he has been very well diet controlled. Next outreach 1 mth unless I hear from him sooner.

## 2023-12-07 ENCOUNTER — PATIENT OUTREACH (OUTPATIENT)
Dept: FAMILY MEDICINE CLINIC | Facility: CLINIC | Age: 53
End: 2023-12-07

## 2023-12-08 NOTE — PROGRESS NOTES
Patient sent message that he is having issues with his glucometer. Appt scheduled with him 12/13 in office to provide education on proper usage or troubleshoot machine.

## 2023-12-13 ENCOUNTER — PATIENT OUTREACH (OUTPATIENT)
Age: 53
End: 2023-12-13

## 2023-12-13 NOTE — PROGRESS NOTES
During previous outreach, patient had mentioned he could draw blood with lancet. We talked about technique with that and he sent message that her was able to draw blood, but his glucometer wasn't working. Patient requested an in office visit to review technique on how to take a blood glucose reading at home. Met with patient in exam room. He brought his supplies from home. He was not putting the strip in the meter first which this one requires. That solved the meter issue. He was sticking himself where we talked about previously, but the lancet design that he has is poor and lends it's self to double sticking. Showed him the lancets in the office and suggested he talk to the pharmacist about the different types. Asked him to reach out to the provider if he needs a script. His blood glucose prior to eating was 101. Patient desires to go down off his metforim if possible. Suggested morning BG readings and anytime he is having the shaky episodes he described previously. Next outreach 1 mth.

## 2024-01-20 DIAGNOSIS — E78.2 MIXED HYPERLIPIDEMIA: ICD-10-CM

## 2024-01-21 RX ORDER — ROSUVASTATIN CALCIUM 5 MG/1
5 TABLET, COATED ORAL DAILY
Qty: 90 TABLET | Refills: 0 | Status: SHIPPED | OUTPATIENT
Start: 2024-01-21

## 2024-01-22 ENCOUNTER — PATIENT OUTREACH (OUTPATIENT)
Age: 54
End: 2024-01-22

## 2024-01-22 NOTE — PROGRESS NOTES
Saw patient in office in December for BG education. Reached out via [employer] to see how it has been going as well as see what his readings have been . Will await a response. Next in office with provider 2/7

## 2024-02-06 LAB
LEFT EYE DIABETIC RETINOPATHY: NORMAL
RIGHT EYE DIABETIC RETINOPATHY: NORMAL

## 2024-02-07 ENCOUNTER — OFFICE VISIT (OUTPATIENT)
Age: 54
End: 2024-02-07

## 2024-02-07 ENCOUNTER — TELEPHONE (OUTPATIENT)
Dept: ADMINISTRATIVE | Facility: OTHER | Age: 54
End: 2024-02-07

## 2024-02-07 VITALS
BODY MASS INDEX: 29.63 KG/M2 | TEMPERATURE: 98.9 F | SYSTOLIC BLOOD PRESSURE: 122 MMHG | HEIGHT: 73 IN | OXYGEN SATURATION: 96 % | WEIGHT: 223.6 LBS | HEART RATE: 66 BPM | DIASTOLIC BLOOD PRESSURE: 70 MMHG

## 2024-02-07 DIAGNOSIS — E11.9 TYPE 2 DIABETES MELLITUS WITHOUT COMPLICATION, WITHOUT LONG-TERM CURRENT USE OF INSULIN (HCC): Primary | ICD-10-CM

## 2024-02-07 DIAGNOSIS — E66.3 OVERWEIGHT (BMI 25.0-29.9): ICD-10-CM

## 2024-02-07 DIAGNOSIS — E78.2 MODERATE MIXED HYPERLIPIDEMIA NOT REQUIRING STATIN THERAPY: ICD-10-CM

## 2024-02-07 DIAGNOSIS — I10 ESSENTIAL HYPERTENSION: ICD-10-CM

## 2024-02-07 LAB — SL AMB POCT HEMOGLOBIN AIC: 5.8 (ref ?–6.5)

## 2024-02-07 PROCEDURE — 83036 HEMOGLOBIN GLYCOSYLATED A1C: CPT | Performed by: NURSE PRACTITIONER

## 2024-02-07 PROCEDURE — 99214 OFFICE O/P EST MOD 30 MIN: CPT | Performed by: NURSE PRACTITIONER

## 2024-02-07 NOTE — ASSESSMENT & PLAN NOTE
Blood pressure well-controlled at this time on losartan 100 mg.  Discussed low salt diet, increasing exercise to 30 mins 3-4x a week. Check home BP and record for next visit. BP goal <140/90. Discussed ER precautions for chest pain, stroke precautions, or BP of 180/120 or greater (hypertensive crisis), change in LOC or worsening symptoms. Call with new or worsening symptoms.   If you have numbness, tingling, weakness, or severe headache with elevated BP go to the ED.

## 2024-02-07 NOTE — ASSESSMENT & PLAN NOTE
Patient has been steadily losing weight, working on diet and exercise.  Will recheck lipid panel prior to annual physical.

## 2024-02-07 NOTE — ASSESSMENT & PLAN NOTE
A1c today is 5.8 down from 6.0 last appointment.  Patient reports he has been feeling great.  He has been working on weight loss, focusing on healthy diet, and add again walking with work.  Patient reports he is up-to-date on his diabetic eye exam, and has brought in a copy to the office today.  Will follow-up in 3 months.  Lab Results   Component Value Date    HGBA1C 5.8 02/07/2024

## 2024-02-07 NOTE — PROGRESS NOTES
Name: Blake Ward      : 1970      MRN: [id number]  Encounter Provider: CHRISTOPHER Hayward  Encounter Date: 2024   Encounter department: [facility] PARTNERSHIP WITH [facility]    Assessment & Plan     1. Type 2 diabetes mellitus without complication, without long-term current use of insulin (HCC)  Assessment & Plan:  A1c today is 5.8 down from 6.0 last appointment.  Patient reports he has been feeling great.  He has been working on weight loss, focusing on healthy diet, and add again walking with work.  Patient reports he is up-to-date on his diabetic eye exam, and has brought in a copy to the office today.  Will follow-up in 3 months.  Lab Results   Component Value Date    HGBA1C 5.8 2024     Orders:  -     POCT hemoglobin A1c  -     Lipid Panel with Direct LDL reflex  -     Comprehensive metabolic panel  -     CBC and differential    2. Moderate mixed hyperlipidemia not requiring statin therapy  Assessment & Plan:  Patient has been steadily losing weight, working on diet and exercise.  Will recheck lipid panel prior to annual physical.    Orders:  -     Lipid Panel with Direct LDL reflex  -     Comprehensive metabolic panel  -     CBC and differential    3. Essential hypertension  Assessment & Plan:  Blood pressure well-controlled at this time on losartan 100 mg.  Discussed low salt diet, increasing exercise to 30 mins 3-4x a week. Check home BP and record for next visit. BP goal <140/90. Discussed ER precautions for chest pain, stroke precautions, or BP of 180/120 or greater (hypertensive crisis), change in LOC or worsening symptoms. Call with new or worsening symptoms.   If you have numbness, tingling, weakness, or severe headache with elevated BP go to the ED.      Orders:  -     Comprehensive metabolic panel  -     CBC and differential    4. Overweight (BMI 25.0-29.9)  Assessment & Plan:  Goal to consume 500 to 1,000 fewer calories per day for  "a 1-2 lbs weight loss per week. Increase exercise to 30 min, 5 times a week as tolerated for a goal of 150 mins a week. Calorie tracking apps such as myfitness pal can be helpful for keeping track of calorie intake. Decrease simple carbohydrates (white bread, pasta, white rice), and increasing vegetables, fruit, and protein.  Increase water.      Orders:  -     Lipid Panel with Direct LDL reflex  -     CBC and differential      Depression Screening and Follow-up Plan: Patient was screened for depression during today's encounter. They screened negative with a PHQ-2 score of 0.        Subjective        Subjective:    Blake Ward is a 54 y.o. male who presents for follow-up of Type 2 diabetes mellitus.  Current symptoms/problems include none and have been stable.     Known diabetic complications: none  Cardiovascular risk factors: diabetes mellitus, hypertension, and male gender  Current diabetic medications include oral agent (monotherapy): metformin.   Eye exam current (within one year): yes  Weight trend: decreasing steadily  Prior visit with dietician: yes - complex care   Current diet: in general, a \"healthy\" diet    Current exercise: walking a lot    Current monitoring regimen: none    Is He on ACE inhibitor or angiotensin II receptor blocker?   Yes   lisinopril (Zestril)      Review of Systems   Constitutional: Negative.    HENT: Negative.     Eyes: Negative.    Respiratory: Negative.     Cardiovascular: Negative.    Gastrointestinal: Negative.    Genitourinary: Negative.    Musculoskeletal: Negative.    Skin: Negative.    Neurological: Negative.    Psychiatric/Behavioral: Negative.         Current Outpatient Medications on File Prior to Visit   Medication Sig   • Blood Glucose Monitoring Suppl (OneTouch Verio Reflect) w/Device KIT Check blood sugars once daily. Please substitute with appropriate alternative as covered by patient's insurance. Dx: E11.65   • Continuous Blood Gluc Sensor (FreeStyle Sabine 3 Sensor) " "MISC Use 1 each every 14 (fourteen) days   • glucose blood (OneTouch Verio) test strip Check blood sugars once daily. Please substitute with appropriate alternative as covered by patient's insurance. Dx: E11.65   • losartan (COZAAR) 100 MG tablet Take 1 tablet (100 mg total) by mouth daily   • metFORMIN (GLUCOPHAGE-XR) 500 mg 24 hr tablet Take 2 tablets (1,000 mg total) by mouth daily with breakfast   • OneTouch Delica Lancets 33G MISC Check blood sugars once daily. Please substitute with appropriate alternative as covered by patient's insurance. Dx: E11.65   • rosuvastatin (CRESTOR) 5 mg tablet Take 1 tablet (5 mg total) by mouth daily       Objective     /70 (BP Location: Left arm, Patient Position: Sitting, Cuff Size: Large)   Pulse 66   Temp 98.9 °F (37.2 °C)   Ht 6' 1\" (1.854 m)   Wt 101 kg (223 lb 9.6 oz)   SpO2 96%   BMI 29.50 kg/m²     Physical Exam  Vitals and nursing note reviewed.   Constitutional:       General: He is not in acute distress.     Appearance: Normal appearance. He is not ill-appearing or toxic-appearing.   HENT:      Head: Normocephalic and atraumatic.      Right Ear: External ear normal.      Left Ear: External ear normal.      Nose: Nose normal.   Eyes:      General:         Right eye: No discharge.         Left eye: No discharge.      Conjunctiva/sclera: Conjunctivae normal.      Pupils: Pupils are equal, round, and reactive to light.   Cardiovascular:      Rate and Rhythm: Normal rate and regular rhythm.      Heart sounds: Normal heart sounds.   Pulmonary:      Effort: Pulmonary effort is normal.      Breath sounds: Normal breath sounds.   Abdominal:      General: Bowel sounds are normal.      Palpations: Abdomen is soft.   Musculoskeletal:         General: Normal range of motion.      Cervical back: Neck supple.      Right lower leg: No edema.      Left lower leg: No edema.   Skin:     General: Skin is warm and dry.   Neurological:      Mental Status: He is alert and " oriented to person, place, and time.   Psychiatric:         Mood and Affect: Mood normal.       CHRISTOPHER Hayward

## 2024-02-07 NOTE — LETTER
Diabetic Eye Exam Form    Date Requested: 24  Patient: Blake Ward  Patient : 1970   Referring Provider: CHRISTOPHER Hayward      DIABETIC Eye Exam Date _______________________________      Type of Exam MUST be documented for Diabetic Eye Exams. Please CHECK ONE.     Retinal Exam       Dilated Retinal Exam       OCT       Optomap-Iris Exam      Fundus Photography       Left Eye - Please check Retinopathy or No Retinopathy        Exam did show retinopathy    Exam did not show retinopathy       Right Eye - Please check Retinopathy or No Retinopathy       Exam did show retinopathy    Exam did not show retinopathy       Comments __________________________________________________________    Practice Providing Exam ______________________________________________    Exam Performed By (print name) _______________________________________      Provider Signature ___________________________________________________      These reports are needed for  compliance.  Please fax this completed form and a copy of the Diabetic Eye Exam report to our office located at [address] [place] [address] as soon as possible via Fax [phone] attention Kerissa: Phone [phone]  We thank you for your assistance in treating our mutual patient.

## 2024-02-07 NOTE — PATIENT INSTRUCTIONS
Diabetes and Exercise   WHAT YOU NEED TO KNOW:   How will exercise help me manage diabetes?  Physical activity, such as exercise, can help keep your blood sugar level steady or improve insulin resistance. Activity can help decrease your risk for heart disease, and help you lose weight, if needed. Exercise can also help lower your A1c or keep it at goal. Your diabetes care team provider will help you create an exercise plan. The plan will be based on the type of diabetes you have and your starting fitness level.  What are some tips to help me create and meet my exercise goals?  Your diabetes care provider may recommend counseling to help you meet these or other exercise goals:  Set a goal for at least 150 minutes (2.5 hours) of moderate to vigorous aerobic activity each week.  Aerobic activity helps your heart stay strong. Aerobic activity includes walking, bicycling, dancing, swimming, and raking leaves. Spread aerobic activity over 3 to 5 days. Do not take more than 2 days off in a row. It is best to do at least 10 minutes at a time and 30 minutes each day. You can work up to these goals. Remember that any activity is better than no activity. Over time, you can make exercise more intense or last longer. You can also add more days of exercise as your fitness level improves. Your diabetes care team can help you make a step-by-step plan to achieve your goals.         Set a strength training goal of 2 to 3 times a week.  Take at least 1 day off in between strength training sessions. Strength training helps you keep the muscles you have and build new muscles. Strength training includes lifting weights, climbing stairs, yoga, and clarissa chi.         Older adults should include balance training 2 to 3 times each week.  These include walking backwards, standing on one foot, and walking heel to toe in a straight line.             What are some other healthy activity tips?   Stretch before and after you exercise to prevent  injury.         Drink water or liquids that do not contain sugar before, during, and after exercise. Ask your dietitian or healthcare provider which liquids you should drink when you exercise.    Do not  sit for longer than 30 minutes at a time. If you cannot walk around, at least stand up. This will help you stay active and keep your blood circulating. Try to be active throughout your day.       What do I need to know about exercise and my blood sugar levels?  Check your blood sugar level before and after exercise, if you use insulin. Healthcare providers may tell you to change the amount of insulin you take or food you eat.  If your blood sugar level is high, check your blood or urine for ketones before you exercise. Do not exercise if your blood sugar level is high and you have ketones.     If your blood sugar level is less than 100 mg/dL, have a carbohydrate snack before you exercise. Examples are 4 to 6 crackers, ½ banana, 8 ounces (1 cup) of milk, or 4 ounces (½ cup) of juice.       Call your local emergency number (911 in the ) if:   You have chest pain or shortness of breath.      When should I seek immediate care?   You have a low blood sugar level and it does not improve with treatment. Symptoms are trouble thinking, a pounding heartbeat, and sweating.    Your blood sugar level is above 240 mg/dL and does not come down within 15 minutes of treatment.    You have blurred or double vision.    Your breath has a fruity, sweet smell, or your breathing is shallow.    When should I call my doctor or diabetes care team?   You have ketones in your blood or urine.    You have a fever.    Your blood sugar levels are higher than your target goals.    You often have low blood sugar levels.    Your skin is red, dry, warm, or swollen.    You have a wound that does not heal.    You have trouble coping with diabetes, or you feel anxious or depressed.    You have questions or concerns about your condition or care.    CARE  AGREEMENT:   You have the right to help plan your care. Learn about your health condition and how it may be treated. Discuss treatment options with your healthcare providers to decide what care you want to receive. You always have the right to refuse treatment. The above information is an  only. It is not intended as medical advice for individual conditions or treatments. Talk to your doctor, nurse or pharmacist before following any medical regimen to see if it is safe and effective for you.  © Copyright [employer] 2023 Information is for End User's use only and may not be sold, redistributed or otherwise used for commercial purposes.

## 2024-02-07 NOTE — TELEPHONE ENCOUNTER
Upon review of the In Basket request and the patient's chart, initial outreach has been made via fax to facility. Please see Contacts section for details.     Thank you  BROWN HALL

## 2024-02-07 NOTE — TELEPHONE ENCOUNTER
----- Message from Na Doyle sent at 2/7/2024  9:22 AM EST -----  Regarding: Care Gap Request  02/07/24 9:22 AM    Hello, our patient attached above has had Diabetic Eye Exam completed/performed. Please assist in updating the patient chart by pulling the document from the Media Tab. The date of service is 2/7/2024.     Thank you,  [facility]

## 2024-02-08 NOTE — TELEPHONE ENCOUNTER
Upon review of the In Basket request we were able to locate, review, and update the patient chart as requested for Diabetic Eye Exam.    Any additional questions or concerns should be emailed to the Practice Liaisons via the appropriate education email address, please do not reply via In Interse.    Thank you  BROWN HALL

## 2024-02-13 ENCOUNTER — PATIENT OUTREACH (OUTPATIENT)
Age: 54
End: 2024-02-13

## 2024-02-13 NOTE — PROGRESS NOTES
Chart Review. Patient was in office for provider visit 2/7. His A1c is at 5.8 and his bmi is down slightly to 29.5. Patient is controlled on metformin daily. He had previously mentioned symptomatic low blood glucoses, but this was not mentioned in provider notes. [employer] message sent to provider following up with him.

## 2024-02-26 DIAGNOSIS — I10 ESSENTIAL HYPERTENSION: ICD-10-CM

## 2024-02-26 RX ORDER — LOSARTAN POTASSIUM 100 MG/1
100 TABLET ORAL DAILY
Qty: 90 TABLET | Refills: 1 | Status: SHIPPED | OUTPATIENT
Start: 2024-02-26

## 2024-03-27 ENCOUNTER — PATIENT OUTREACH (OUTPATIENT)
Dept: FAMILY MEDICINE CLINIC | Facility: CLINIC | Age: 54
End: 2024-03-27

## 2024-03-27 NOTE — PROGRESS NOTES
Patient sent response to check in message sent 2/13. He is doing well and maintaining weight. Sent response with ways to stimulate weight gain if he desires and reminder we are here if he has any questions.

## 2024-04-03 ENCOUNTER — PATIENT MESSAGE (OUTPATIENT)
Dept: FAMILY MEDICINE CLINIC | Facility: CLINIC | Age: 54
End: 2024-04-03

## 2024-04-03 ENCOUNTER — RA CDI HCC (OUTPATIENT)
Dept: OTHER | Facility: HOSPITAL | Age: 54
End: 2024-04-03

## 2024-04-03 ENCOUNTER — CLINICAL SUPPORT (OUTPATIENT)
Age: 54
End: 2024-04-03

## 2024-04-03 DIAGNOSIS — Z00.00 WELLNESS EXAMINATION: Primary | ICD-10-CM

## 2024-04-03 DIAGNOSIS — E11.9 TYPE 2 DIABETES MELLITUS WITHOUT COMPLICATION, WITHOUT LONG-TERM CURRENT USE OF INSULIN (HCC): ICD-10-CM

## 2024-04-03 PROCEDURE — 36415 COLL VENOUS BLD VENIPUNCTURE: CPT

## 2024-04-03 NOTE — PROGRESS NOTES
HCC coding opportunities       Chart reviewed, no opportunity found: CHART REVIEWED, NO OPPORTUNITY FOUND      This is a reminder to address (resolve/update/assess) ALL HCC (risk adjustment) codes as found on active problem list for 2024 as patient scores reset to zero LAURIE.  Also, just a reminder to please review and assess all other chronic conditions for 2024  Patients Insurance        Commercial Insurance: [employer]

## 2024-04-04 LAB
ALBUMIN SERPL-MCNC: 4.3 G/DL (ref 3.6–5.1)
ALBUMIN/GLOB SERPL: 1.4 (CALC) (ref 1–2.5)
ALP SERPL-CCNC: 51 U/L (ref 35–144)
AST SERPL-CCNC: 16 U/L (ref 10–35)
BASOPHILS # BLD AUTO: 94 CELLS/UL (ref 0–200)
BASOPHILS NFR BLD AUTO: 1.1 %
BILIRUB SERPL-MCNC: 0.4 MG/DL (ref 0.2–1.2)
BUN SERPL-MCNC: 13 MG/DL (ref 7–25)
BUN/CREAT SERPL: 19 (CALC) (ref 6–22)
CALCIUM SERPL-MCNC: 9.4 MG/DL (ref 8.6–10.3)
CHLORIDE SERPL-SCNC: 104 MMOL/L (ref 98–110)
CHOLEST SERPL-MCNC: 152 MG/DL
CHOLEST/HDLC SERPL: 3.3 (CALC)
CREAT SERPL-MCNC: 0.68 MG/DL (ref 0.7–1.3)
EOSINOPHIL # BLD AUTO: 264 CELLS/UL (ref 15–500)
EOSINOPHIL NFR BLD AUTO: 3.1 %
ERYTHROCYTE [DISTWIDTH] IN BLOOD BY AUTOMATED COUNT: 12.7 % (ref 11–15)
GFR/BSA.PRED SERPLBLD CYS-BASED-ARV: 111 ML/MIN/1.73M2
GLOBULIN SER CALC-MCNC: 3 G/DL (CALC) (ref 1.9–3.7)
GLUCOSE SERPL-MCNC: 100 MG/DL (ref 65–99)
HCT VFR BLD AUTO: 43.8 % (ref 38.5–50)
HDLC SERPL-MCNC: 46 MG/DL
HGB BLD-MCNC: 14.8 G/DL (ref 13.2–17.1)
LDLC SERPL CALC-MCNC: 79 MG/DL (CALC)
LYMPHOCYTES # BLD AUTO: 2354.5 CELLS/UL (ref 850–3900)
LYMPHOCYTES NFR BLD AUTO: 27.7 %
MCH RBC QN AUTO: 30.5 PG (ref 27–33)
MCHC RBC AUTO-ENTMCNC: 33.8 G/DL (ref 32–36)
MCV RBC AUTO: 90.3 FL (ref 80–100)
MONOCYTES # BLD AUTO: 731 CELLS/UL (ref 200–950)
MONOCYTES NFR BLD AUTO: 8.6 %
NEUTROPHILS # BLD AUTO: 5058 CELLS/UL (ref 1500–7800)
NEUTROPHILS NFR BLD AUTO: 59.5 %
NONHDLC SERPL-MCNC: 106 MG/DL (CALC)
PLATELET # BLD AUTO: 254 THOUSAND/UL (ref 140–400)
PMV BLD REES-ECKER: 11.7 FL (ref 7.5–12.5)
POTASSIUM SERPL-SCNC: 4.1 MMOL/L (ref 3.5–5.3)
PROT SERPL-MCNC: 7.3 G/DL (ref 6.1–8.1)
RBC # BLD AUTO: 4.85 MILLION/UL (ref 4.2–5.8)
SODIUM SERPL-SCNC: 138 MMOL/L (ref 135–146)
TRIGL SERPL-MCNC: 172 MG/DL
WBC # BLD AUTO: 8.5 THOUSAND/UL (ref 3.8–10.8)

## 2024-04-26 DIAGNOSIS — E78.2 MIXED HYPERLIPIDEMIA: ICD-10-CM

## 2024-04-27 RX ORDER — ROSUVASTATIN CALCIUM 5 MG/1
5 TABLET, COATED ORAL DAILY
Qty: 90 TABLET | Refills: 0 | Status: SHIPPED | OUTPATIENT
Start: 2024-04-27

## 2024-05-15 ENCOUNTER — OFFICE VISIT (OUTPATIENT)
Age: 54
End: 2024-05-15

## 2024-05-15 ENCOUNTER — APPOINTMENT (OUTPATIENT)
Age: 54
End: 2024-05-15
Payer: COMMERCIAL

## 2024-05-15 ENCOUNTER — PATIENT OUTREACH (OUTPATIENT)
Dept: FAMILY MEDICINE CLINIC | Facility: CLINIC | Age: 54
End: 2024-05-15

## 2024-05-15 VITALS
SYSTOLIC BLOOD PRESSURE: 126 MMHG | BODY MASS INDEX: 30 KG/M2 | HEIGHT: 73 IN | OXYGEN SATURATION: 97 % | TEMPERATURE: 98.4 F | DIASTOLIC BLOOD PRESSURE: 72 MMHG | HEART RATE: 67 BPM | WEIGHT: 226.4 LBS

## 2024-05-15 DIAGNOSIS — Z00.00 ANNUAL PHYSICAL EXAM: Primary | ICD-10-CM

## 2024-05-15 DIAGNOSIS — M25.531 RIGHT WRIST PAIN: Primary | ICD-10-CM

## 2024-05-15 DIAGNOSIS — M25.531 RIGHT WRIST PAIN: ICD-10-CM

## 2024-05-15 DIAGNOSIS — I10 ESSENTIAL HYPERTENSION: ICD-10-CM

## 2024-05-15 DIAGNOSIS — E66.3 OVERWEIGHT (BMI 25.0-29.9): ICD-10-CM

## 2024-05-15 DIAGNOSIS — Z12.5 SCREENING FOR PROSTATE CANCER: ICD-10-CM

## 2024-05-15 DIAGNOSIS — E11.9 TYPE 2 DIABETES MELLITUS WITHOUT COMPLICATION, WITHOUT LONG-TERM CURRENT USE OF INSULIN (HCC): ICD-10-CM

## 2024-05-15 LAB — SL AMB POCT HEMOGLOBIN AIC: 5.8 (ref ?–6.5)

## 2024-05-15 PROCEDURE — 99396 PREV VISIT EST AGE 40-64: CPT | Performed by: NURSE PRACTITIONER

## 2024-05-15 PROCEDURE — 83036 HEMOGLOBIN GLYCOSYLATED A1C: CPT | Performed by: NURSE PRACTITIONER

## 2024-05-15 PROCEDURE — 99214 OFFICE O/P EST MOD 30 MIN: CPT | Performed by: NURSE PRACTITIONER

## 2024-05-15 PROCEDURE — 73110 X-RAY EXAM OF WRIST: CPT

## 2024-05-15 NOTE — ASSESSMENT & PLAN NOTE
Well controlled on current medication. Foot exam WNL. Pt is UTD on diabetic eye exam.   Lab Results   Component Value Date    HGBA1C 5.8 02/07/2024

## 2024-05-15 NOTE — PROGRESS NOTES
Chart Review. Patient has been controlled with diabetes and no longer requires CM services. Sent [employer] message to patient with how to get ahold of CM if needed in the future. Case closed.

## 2024-05-15 NOTE — ASSESSMENT & PLAN NOTE
Pt here today for annual well exam. Pt has no concerns at this time. He has been doing a great job with diet and weight loss. Pt's A1c is well controlled at this time. Foot exam was normal. We discussed diet and exercise goals. Pt is UTD on colon cancer screening. F/U 4 months.

## 2024-05-15 NOTE — PROGRESS NOTES
Adult Annual Physical  Name: Blake Ward      : 1970      MRN: [id number]  Encounter Provider: CHRISTOPHER Hayward  Encounter Date: 5/15/2024   Encounter department: [facility] IN PARTNERSHIP WITH [facility]    Assessment & Plan   1. Annual physical exam  Assessment & Plan:  Pt here today for annual well exam. Pt has no concerns at this time. He has been doing a great job with diet and weight loss. Pt's A1c is well controlled at this time. Foot exam was normal. We discussed diet and exercise goals. Pt is UTD on colon cancer screening. F/U 4 months.   2. Right wrist pain  Assessment & Plan:  Right hand dominant patient here with pain in the right wrist since December, no known recent injury, but fractured wrist in  at work. He reports that he does a lot of repetitive movement. Pt reports wrist is achy 6/10 pain. Washing hair, driving (using steering wheel), cutting, gripping makes pain worse. He is using a wrist brace to help with pain, it does offer support with compression.   Orders:  -     XR wrist 3+ vw right; Future; Expected date: 05/15/2024  -     Ambulatory Referral to Physical Therapy; Future  3. Type 2 diabetes mellitus without complication, without long-term current use of insulin (HCC)  Assessment & Plan:  Well controlled on current medication. Foot exam WNL. Pt is UTD on diabetic eye exam.   Lab Results   Component Value Date    HGBA1C 5.8 2024     Orders:  -     POCT hemoglobin A1c  4. Screening for prostate cancer  -     PSA, total and free; Future  -     PSA, total and free  5. Essential hypertension  Assessment & Plan:  Blood pressure well controlled at this time on Losartan 100mg.  Discussed low salt diet, increasing exercise to 30 mins 3-4x a week. Check home BP and record for next visit. BP goal <140/90. Discussed ER precautions for chest pain, stroke precautions, or BP of 180/120 or greater (hypertensive crisis), change in LOC or  worsening symptoms. Call with new or worsening symptoms.   If you have numbness, tingling, weakness, or severe headache with elevated BP go to the ED.    6. Overweight (BMI 25.0-29.9)  Assessment & Plan:  Goal to consume 500 to 1,000 fewer calories per day for a 1-2 lbs weight loss per week. Increase exercise to 30 min, 5 times a week as tolerated for a goal of 150 mins a week. Calorie tracking apps such as myfitness pal can be helpful for keeping track of calorie intake. Decrease simple carbohydrates (white bread, pasta, white rice), and increasing vegetables, fruit, and protein.  Increase water.    Immunizations and preventive care screenings were discussed with patient today. Appropriate education was printed on patient's after visit summary.    Discussed risks and benefits of prostate cancer screening. We discussed the controversial history of PSA screening for prostate cancer in the [place] as well as the risk of over detection and over treatment of prostate cancer by way of PSA screening.  The patient understands that PSA blood testing is an imperfect way to screen for prostate cancer and that elevated PSA levels in the blood may also be caused by infection, inflammation, prostatic trauma or manipulation, urological procedures, or by benign prostatic enlargement.    The role of the digital rectal examination in prostate cancer screening was also discussed and I discussed with him that there is large interobserver variability in the findings of digital rectal examination.    Counseling:  Alcohol/drug use: discussed moderation in alcohol intake, the recommendations for healthy alcohol use, and avoidance of illicit drug use.  Dental Health: discussed importance of regular tooth brushing, flossing, and dental visits.  Injury prevention: discussed safety/seat belts, safety helmets, smoke detectors, carbon dioxide detectors, and smoking near bedding or upholstery.  Sexual health: discussed sexually transmitted  diseases, partner selection, use of condoms, avoidance of unintended pregnancy, and contraceptive alternatives.  Exercise: the importance of regular exercise/physical activity was discussed. Recommend exercise 3-5 times per week for at least 30 minutes.          History of Present Illness     Adult Annual Physical:  Patient presents for annual physical.     Diet and Physical Activity:  - Diet/Nutrition: well balanced diet.  - Exercise: 1-2 times a week on average. Biking    Depression Screening:  - PHQ-2 Score: 0    General Health:  - Sleep: sleeps well.  - Hearing: normal hearing right ear.  - Vision: goes for regular eye exams and most recent eye exam < 1 year ago.  - Dental: regular dental visits.    /GYN Health:    - History of STDs: no     Health:  - History of STDs: no.   Diabetic Foot Exam    Patient's shoes and socks removed.    Right Foot/Ankle   Right Foot Inspection  Skin Exam: skin normal and skin intact. No dry skin, no warmth, no callus, no erythema, no maceration, no abnormal color, no pre-ulcer, no ulcer and no callus.     Toe Exam: ROM and strength within normal limits.     Sensory   Vibration: intact  Proprioception: intact  Monofilament testing: intact    Vascular  Capillary refills: < 3 seconds  The right DP pulse is 2+. The right PT pulse is 2+.     Left Foot/Ankle  Left Foot Inspection  Skin Exam: skin normal and skin intact. No dry skin, no warmth, no erythema, no maceration, normal color, no pre-ulcer, no ulcer and no callus.     Toe Exam: ROM and strength within normal limits.     Sensory   Vibration: intact  Proprioception: intact  Monofilament testing: intact    Vascular  Capillary refills: < 3 seconds  The left DP pulse is 2+. The left PT pulse is 2+.     Assign Risk Category  No deformity present  No loss of protective sensation  No weak pulses  Risk: 0    Review of Systems   Constitutional: Negative.  Negative for chills and fever.   HENT: Negative.  Negative for ear pain and sore  "throat.    Eyes: Negative.  Negative for pain and visual disturbance.   Respiratory: Negative.  Negative for cough and shortness of breath.    Cardiovascular: Negative.  Negative for chest pain and palpitations.   Gastrointestinal: Negative.  Negative for abdominal pain and vomiting.   Genitourinary: Negative.  Negative for dysuria and hematuria.   Musculoskeletal: Negative.  Negative for arthralgias and back pain.   Skin:  Negative for color change and rash.   Neurological: Negative.  Negative for seizures and syncope.   Psychiatric/Behavioral: Negative.     All other systems reviewed and are negative.    Pertinent Medical History   Diabetes and HTN.         Objective     /72 (BP Location: Left arm, Patient Position: Sitting, Cuff Size: Large)   Pulse 67   Temp 98.4 °F (36.9 °C)   Ht 6' 1\" (1.854 m)   Wt 103 kg (226 lb 6.4 oz)   SpO2 97%   BMI 29.87 kg/m²     Physical Exam  Vitals and nursing note reviewed.   Constitutional:       General: He is not in acute distress.     Appearance: He is well-developed.   HENT:      Head: Normocephalic and atraumatic.      Right Ear: Tympanic membrane, ear canal and external ear normal.      Left Ear: Tympanic membrane, ear canal and external ear normal.      Nose: Nose normal. No congestion or rhinorrhea.      Mouth/Throat:      Mouth: Mucous membranes are moist.      Pharynx: No oropharyngeal exudate or posterior oropharyngeal erythema.   Eyes:      General:         Right eye: No discharge.         Left eye: No discharge.      Conjunctiva/sclera: Conjunctivae normal.   Cardiovascular:      Rate and Rhythm: Normal rate and regular rhythm.      Pulses: no weak pulses.           Dorsalis pedis pulses are 2+ on the right side and 2+ on the left side.        Posterior tibial pulses are 2+ on the right side and 2+ on the left side.      Heart sounds: No murmur heard.  Pulmonary:      Effort: Pulmonary effort is normal. No respiratory distress.      Breath sounds: Normal " breath sounds.   Abdominal:      General: Bowel sounds are normal. There is no distension.      Palpations: Abdomen is soft.      Tenderness: There is no abdominal tenderness. There is no rebound.   Musculoskeletal:         General: No swelling.      Cervical back: Neck supple.   Feet:      Right foot:      Skin integrity: No ulcer, skin breakdown, erythema, warmth, callus or dry skin.      Left foot:      Skin integrity: No ulcer, skin breakdown, erythema, warmth, callus or dry skin.   Lymphadenopathy:      Cervical: No cervical adenopathy.   Skin:     General: Skin is warm and dry.      Capillary Refill: Capillary refill takes less than 2 seconds.   Neurological:      Mental Status: He is alert and oriented to person, place, and time.   Psychiatric:         Mood and Affect: Mood normal.         Behavior: Behavior normal.         Thought Content: Thought content normal.         Judgment: Judgment normal.       Administrative Statements   I have spent a total time of 40 minutes on 05/15/24 In caring for this patient including Risks and benefits of tx options, Instructions for management, Patient and family education, Risk factor reductions, and Impressions.

## 2024-05-15 NOTE — ASSESSMENT & PLAN NOTE
Right hand dominant patient here with pain in the right wrist since December, no known recent injury, but fractured wrist in 2004 at work. He reports that he does a lot of repetitive movement. Pt reports wrist is achy 6/10 pain. Washing hair, driving (using steering wheel), cutting, gripping makes pain worse. He is using a wrist brace to help with pain, it does offer support with compression.

## 2024-05-15 NOTE — ASSESSMENT & PLAN NOTE
Blood pressure well controlled at this time on Losartan 100mg.  Discussed low salt diet, increasing exercise to 30 mins 3-4x a week. Check home BP and record for next visit. BP goal <140/90. Discussed ER precautions for chest pain, stroke precautions, or BP of 180/120 or greater (hypertensive crisis), change in LOC or worsening symptoms. Call with new or worsening symptoms.   If you have numbness, tingling, weakness, or severe headache with elevated BP go to the ED.

## 2024-05-16 LAB
ALBUMIN/CREAT UR: NORMAL MG/G CREAT
CREAT UR-MCNC: 114 MG/DL (ref 20–320)
MICROALBUMIN UR-MCNC: <0.2 MG/DL
PSA FREE MFR SERPL: 33 % (CALC)
PSA FREE SERPL-MCNC: 0.1 NG/ML
PSA SERPL-MCNC: 0.3 NG/ML

## 2024-06-07 DIAGNOSIS — M25.531 RIGHT WRIST PAIN: Primary | ICD-10-CM

## 2024-06-10 DIAGNOSIS — E11.9 TYPE 2 DIABETES MELLITUS WITHOUT COMPLICATION, WITHOUT LONG-TERM CURRENT USE OF INSULIN (HCC): ICD-10-CM

## 2024-06-10 RX ORDER — METFORMIN HYDROCHLORIDE 500 MG/1
1000 TABLET, EXTENDED RELEASE ORAL
Qty: 180 TABLET | Refills: 3 | Status: SHIPPED | OUTPATIENT
Start: 2024-06-10

## 2024-06-14 PROBLEM — Z12.5 SCREENING FOR PROSTATE CANCER: Status: RESOLVED | Noted: 2024-05-15 | Resolved: 2024-06-14

## 2024-06-25 DIAGNOSIS — E78.2 MIXED HYPERLIPIDEMIA: ICD-10-CM

## 2024-06-25 RX ORDER — ROSUVASTATIN CALCIUM 5 MG/1
5 TABLET, COATED ORAL DAILY
Qty: 90 TABLET | Refills: 3 | Status: SHIPPED | OUTPATIENT
Start: 2024-06-25

## 2024-08-26 DIAGNOSIS — I10 ESSENTIAL HYPERTENSION: ICD-10-CM

## 2024-08-26 RX ORDER — LOSARTAN POTASSIUM 100 MG/1
100 TABLET ORAL DAILY
Qty: 90 TABLET | Refills: 3 | Status: SHIPPED | OUTPATIENT
Start: 2024-08-26

## 2024-09-11 ENCOUNTER — RA CDI HCC (OUTPATIENT)
Dept: OTHER | Facility: HOSPITAL | Age: 54
End: 2024-09-11

## 2024-09-11 PROBLEM — Z00.00 ANNUAL PHYSICAL EXAM: Status: RESOLVED | Noted: 2024-05-15 | Resolved: 2024-09-11

## 2024-09-18 ENCOUNTER — OFFICE VISIT (OUTPATIENT)
Age: 54
End: 2024-09-18

## 2024-09-18 VITALS
BODY MASS INDEX: 30.88 KG/M2 | HEART RATE: 79 BPM | TEMPERATURE: 98 F | SYSTOLIC BLOOD PRESSURE: 128 MMHG | HEIGHT: 73 IN | WEIGHT: 233 LBS | OXYGEN SATURATION: 97 % | DIASTOLIC BLOOD PRESSURE: 68 MMHG

## 2024-09-18 DIAGNOSIS — E66.09 CLASS 1 OBESITY DUE TO EXCESS CALORIES WITHOUT SERIOUS COMORBIDITY WITH BODY MASS INDEX (BMI) OF 30.0 TO 30.9 IN ADULT: ICD-10-CM

## 2024-09-18 DIAGNOSIS — I10 ESSENTIAL HYPERTENSION: ICD-10-CM

## 2024-09-18 DIAGNOSIS — M25.531 RIGHT WRIST PAIN: ICD-10-CM

## 2024-09-18 DIAGNOSIS — E11.9 TYPE 2 DIABETES MELLITUS WITHOUT COMPLICATION, WITHOUT LONG-TERM CURRENT USE OF INSULIN (HCC): Primary | ICD-10-CM

## 2024-09-18 PROBLEM — E66.811 CLASS 1 OBESITY DUE TO EXCESS CALORIES WITHOUT SERIOUS COMORBIDITY WITH BODY MASS INDEX (BMI) OF 30.0 TO 30.9 IN ADULT: Status: ACTIVE | Noted: 2023-10-25

## 2024-09-18 LAB — SL AMB POCT HEMOGLOBIN AIC: 5.9 (ref ?–6.5)

## 2024-09-18 PROCEDURE — 99214 OFFICE O/P EST MOD 30 MIN: CPT | Performed by: NURSE PRACTITIONER

## 2024-09-18 PROCEDURE — 83036 HEMOGLOBIN GLYCOSYLATED A1C: CPT | Performed by: NURSE PRACTITIONER

## 2024-09-18 NOTE — ASSESSMENT & PLAN NOTE
Pt seeing [facility] and is getting injections every 6 weeks. He has decreased ROM, but he was told this was caused by arthritis.

## 2024-09-18 NOTE — ASSESSMENT & PLAN NOTE
BP well controlled.   Discussed low salt diet, increasing exercise to 30 mins 3-4x a week. Check home BP and record for next visit. BP goal <140/90. Discussed ER precautions for chest pain, stroke precautions, or BP of 180/120 or greater (hypertensive crisis), change in LOC or worsening symptoms. Call with new or worsening symptoms.   If you have numbness, tingling, weakness, or severe headache with elevated BP go to the ED.

## 2024-09-18 NOTE — ASSESSMENT & PLAN NOTE
Goal to consume 500 to 1,000 fewer calories per day for a 1-2 lbs weight loss per week. Increase exercise to 30 min, 5 times a week as tolerated for a goal of 150 mins a week. Calorie tracking apps such as myfitness pal can be helpful for keeping track of calorie intake. Decrease simple carbohydrates (white bread, pasta, white rice), and increasing vegetables, fruit, and protein.  Increase water.

## 2024-09-18 NOTE — PATIENT INSTRUCTIONS
"Patient Education     Type 2 diabetes   The Basics   Written by the doctors and editors at [facility]   What is type 2 diabetes? -- This is a disorder that disrupts the way the body uses sugar. It is sometimes called type 2 diabetes mellitus.  All of the cells in the body need sugar to work normally. Sugar gets into the cells with the help of a hormone called insulin. Insulin is made by the pancreas, an organ in the belly. If there is not enough insulin, or if cells in the body don't respond normally to insulin, sugar builds up in the blood. That is what happens to people with diabetes.  There are 2 different types of diabetes:   In type 1 diabetes, the pancreas makes little or no insulin.   In type 2 diabetes, the pancreas still makes some insulin, but the cells in the body stop responding normally. Eventually, the pancreas cannot make enough insulin to keep up.  Having excess body weight or obesity increases a person's risk of developing type 2 diabetes. But people without excess body weight can get diabetes, too.  What are the symptoms of type 2 diabetes? -- Type 2 diabetes usually causes no symptoms. When symptoms do happen, they include:   Needing to urinate often   Intense thirst   Blurry vision  Can diabetes lead to other health problems? -- Yes. Type 2 diabetes might not make you feel sick. But if it is not managed, it can lead to serious problems over time, such as:   Heart attacks   Strokes   Kidney disease   Vision problems (or even blindness)   Pain or loss of feeling in the hands and feet   Needing to have fingers, toes, or other body parts removed (amputated)  How do I know if I have type 2 diabetes? -- Your doctor or nurse can do a blood test. There are 2 tests that can be used for this. Both involve measuring the amount of sugar in your blood, called your \"blood sugar\" or \"blood glucose\":   One of the tests measures your blood sugar at the time the blood sample is taken. This test is done in the " "morning. You can't eat or drink anything except water for at least 8 hours before the test.   The other test shows what your average blood sugar has been for the past 2 to 3 months. This blood test is called \"hemoglobin A1C\" or just \"A1C.\" It can be checked at any time of the day, even if you have recently eaten.  How is type 2 diabetes treated? -- The goals of treatment are to manage your blood sugar and lower the risk of future problems that can happen in people with diabetes.  Treatment might include:   Lifestyle changes - This is an important part of managing diabetes. It includes eating healthy foods and getting plenty of physical activity.   Medicines - There are a few medicines that help lower blood sugar. Some people need to take pills that help the body make more insulin or that help insulin do its job. Others need insulin shots.  Depending on what medicines you take, you might need to check your blood sugar regularly at home. But not everyone with type 2 diabetes needs to do this. Your doctor or nurse will tell you if you should be checking your blood sugar, and when and how to do this.  Sometimes, people with type 2 diabetes also need medicines to help prevent problems caused by the disease. For instance, medicines used to lower blood pressure can reduce the chances of a heart attack or stroke.   General medical care - It's also important to take care of other areas of your health. This includes watching your blood pressure and cholesterol levels. You should also get certain vaccines, such as vaccines to protect against the flu and coronavirus disease 2019 (\"COVID-19\"). Some people also need a vaccine to prevent pneumonia.  Can type 2 diabetes be prevented? -- Yes. To lower your chances of getting type 2 diabetes, the most important thing you can do is eat a healthy diet and get plenty of physical activity. This can help you lose weight if you are overweight. But eating well and being active are also good " for your overall health. Even gentle activity, like walking, has benefits.  If you smoke, quitting can also lower your risk of type 2 diabetes. Quitting smoking can be difficult, but your doctor or nurse can help.  All topics are updated as new evidence becomes available and our peer review process is complete.  This topic retrieved from [employer] on: Apr 24, 2024.  Topic 51044 Version 23.0  Release: 32.3.2 - C32.113  © 2024 UpToDate, Inc. and/or its affiliates. All rights reserved.  Consumer Information Use and Disclaimer   Disclaimer: This generalized information is a limited summary of diagnosis, treatment, and/or medication information. It is not meant to be comprehensive and should be used as a tool to help the user understand and/or assess potential diagnostic and treatment options. It does NOT include all information about conditions, treatments, medications, side effects, or risks that may apply to a specific patient. It is not intended to be medical advice or a substitute for the medical advice, diagnosis, or treatment of a health care provider based on the health care provider's examination and assessment of a patient's specific and unique circumstances. Patients must speak with a health care provider for complete information about their health, medical questions, and treatment options, including any risks or benefits regarding use of medications. This information does not endorse any treatments or medications as safe, effective, or approved for treating a specific patient. UpToDate, Inc. and its affiliates disclaim any warranty or liability relating to this information or the use thereof.The use of this information is governed by the Terms of Use, available at https://www.wolterskluwer.com/en/know/clinical-effectiveness-terms. 2024© UpToDate, Inc. and its affiliates and/or licensors. All rights reserved.  Copyright   © 2024 UpToDate, Inc. and/or its affiliates. All rights reserved.

## 2024-09-18 NOTE — PROGRESS NOTES
Ambulatory Visit  Name: Blake Ward      : 1970      MRN: [id number]  Encounter Provider: CHRISTOPHER Hayward  Encounter Date: 2024   Encounter department: [facility] IN PARTNERSHIP WITH [facility]    Assessment & Plan  Type 2 diabetes mellitus without complication, without long-term current use of insulin (HCC)  A1C shows diabetes is controlled. Pt did have a 10lb weight gain in the last 6 months. Advised closer monitoring of diet, and increased exercise. Continue on current medications. Pt UTD on eye exam.   Lab Results   Component Value Date    HGBA1C 5.9 2024       Orders:    POCT hemoglobin A1c    Right wrist pain  Pt seeing [facility] and is getting injections every 6 weeks. He has decreased ROM, but he was told this was caused by arthritis.          Essential hypertension  BP well controlled.   Discussed low salt diet, increasing exercise to 30 mins 3-4x a week. Check home BP and record for next visit. BP goal <140/90. Discussed ER precautions for chest pain, stroke precautions, or BP of 180/120 or greater (hypertensive crisis), change in LOC or worsening symptoms. Call with new or worsening symptoms.   If you have numbness, tingling, weakness, or severe headache with elevated BP go to the ED.           Class 1 obesity due to excess calories without serious comorbidity with body mass index (BMI) of 30.0 to 30.9 in adult  Goal to consume 500 to 1,000 fewer calories per day for a 1-2 lbs weight loss per week. Increase exercise to 30 min, 5 times a week as tolerated for a goal of 150 mins a week. Calorie tracking apps such as myfitness pal can be helpful for keeping track of calorie intake. Decrease simple carbohydrates (white bread, pasta, white rice), and increasing vegetables, fruit, and protein.  Increase water.              History of Present Illness       Subjective:    Blake Ward is a 54 y.o. male who presents for follow-up of Type 2 diabetes  "mellitus.  Current symptoms/problems include none and have been stable.   Known diabetic complications: none  Cardiovascular risk factors: diabetes mellitus, dyslipidemia, hypertension, male gender, and obesity (BMI >= 30 kg/m2)  Current diabetic medications include Metformin.   Eye exam current (within one year): yes  Weight trend: fluctuating a bit  Prior visit with dietician: yes - [facility]  Current diet: in general, a \"healthy\" diet    Current exercise: walking          History obtained from : patient  Review of Systems   Constitutional: Negative.  Negative for chills and fever.   HENT: Negative.  Negative for ear pain and sore throat.    Eyes:  Negative for pain and visual disturbance.   Respiratory: Negative.  Negative for cough and shortness of breath.    Cardiovascular: Negative.  Negative for chest pain and palpitations.   Gastrointestinal:  Negative for abdominal pain and vomiting.   Genitourinary: Negative.  Negative for dysuria and hematuria.   Musculoskeletal:  Negative for arthralgias and back pain.   Skin:  Negative for color change and rash.   Neurological: Negative.  Negative for seizures and syncope.   All other systems reviewed and are negative.          Objective     /68 (BP Location: Right arm, Patient Position: Sitting, Cuff Size: Large)   Pulse 79   Temp 98 °F (36.7 °C)   Ht 6' 1\" (1.854 m)   Wt 106 kg (233 lb)   SpO2 97%   BMI 30.74 kg/m²     Physical Exam  Vitals and nursing note reviewed.   Constitutional:       General: He is not in acute distress.     Appearance: Normal appearance. He is normal weight. He is not ill-appearing.   HENT:      Head: Normocephalic and atraumatic.      Nose: Nose normal.   Eyes:      General:         Right eye: No discharge.         Left eye: No discharge.      Conjunctiva/sclera: Conjunctivae normal.   Cardiovascular:      Rate and Rhythm: Normal rate and regular rhythm.      Heart sounds: Normal heart sounds.   Pulmonary:      Effort: Pulmonary effort " is normal.      Breath sounds: Normal breath sounds.   Abdominal:      General: Bowel sounds are normal.      Palpations: Abdomen is soft.   Musculoskeletal:         General: Normal range of motion.      Cervical back: Normal range of motion.      Right lower leg: No edema.      Left lower leg: No edema.   Skin:     General: Skin is warm and dry.   Neurological:      Mental Status: He is alert and oriented to person, place, and time.   Psychiatric:         Mood and Affect: Mood normal.         Behavior: Behavior normal.         Thought Content: Thought content normal.         Judgment: Judgment normal.       Administrative Statements   I have spent a total time of 20 minutes in caring for this patient on the day of the visit/encounter including Risks and benefits of tx options, Instructions for management, Importance of tx compliance, Impressions, Reviewing / ordering tests, medicine, procedures  , and Obtaining or reviewing history  .

## 2024-09-18 NOTE — ASSESSMENT & PLAN NOTE
A1C shows diabetes is controlled. Pt did have a 10lb weight gain in the last 6 months. Advised closer monitoring of diet, and increased exercise. Continue on current medications. Pt UTD on eye exam.   Lab Results   Component Value Date    HGBA1C 5.9 09/18/2024       Orders:    POCT hemoglobin A1c

## 2024-10-20 ENCOUNTER — OFFICE VISIT (OUTPATIENT)
Age: 54
End: 2024-10-20
Payer: COMMERCIAL

## 2024-10-20 VITALS
HEART RATE: 68 BPM | OXYGEN SATURATION: 98 % | WEIGHT: 237 LBS | DIASTOLIC BLOOD PRESSURE: 88 MMHG | SYSTOLIC BLOOD PRESSURE: 128 MMHG | RESPIRATION RATE: 16 BRPM | BODY MASS INDEX: 31.27 KG/M2 | TEMPERATURE: 98.8 F

## 2024-10-20 DIAGNOSIS — J20.9 ACUTE BRONCHITIS, UNSPECIFIED ORGANISM: Primary | ICD-10-CM

## 2024-10-20 PROCEDURE — G0382 LEV 3 HOSP TYPE B ED VISIT: HCPCS | Performed by: PHYSICIAN ASSISTANT

## 2024-10-20 PROCEDURE — S9083 URGENT CARE CENTER GLOBAL: HCPCS | Performed by: PHYSICIAN ASSISTANT

## 2024-10-20 RX ORDER — BENZONATATE 200 MG/1
200 CAPSULE ORAL 3 TIMES DAILY PRN
Qty: 20 CAPSULE | Refills: 0 | Status: SHIPPED | OUTPATIENT
Start: 2024-10-20

## 2024-10-20 RX ORDER — AZITHROMYCIN 250 MG/1
TABLET, FILM COATED ORAL
Qty: 6 TABLET | Refills: 0 | Status: SHIPPED | OUTPATIENT
Start: 2024-10-20 | End: 2024-10-24

## 2024-10-20 RX ORDER — PREDNISONE 10 MG/1
TABLET ORAL
Qty: 21 TABLET | Refills: 0 | Status: SHIPPED | OUTPATIENT
Start: 2024-10-20

## 2024-10-20 NOTE — PROGRESS NOTES
"  [facility]        NAME: Blake Ward is a 54 y.o. male  : 1970    MRN: [id number]  DATE: 2024  TIME: 10:26 AM    Assessment and Plan   Acute bronchitis, unspecified organism [J20.9]  1. Acute bronchitis, unspecified organism  azithromycin (ZITHROMAX) 250 mg tablet    benzonatate (TESSALON) 200 MG capsule    predniSONE 10 mg tablet            Patient Instructions     Pt has bronchitis which I will treat with combination of a Z-pack, Prednisone taper, and Tessalon Perles and rec fluids, rest, discussed OTC cough meds, close observation.   Follow up with PCP in 3-5 days.  Proceed to  ER if symptoms worsen.    If tests have been performed at [facility], our office will contact you with results if changes need to be made to the care plan discussed with you at the visit.  [facility]    Chief Complaint     Chief Complaint   Patient presents with    Cough     Per patient message \"I have a persistent night time cough for about three days  I have had cough and mild cold symptoms for about 2 weeks  I took a Covid test it was negative   My lungs seem really irritated especially when I lay down at night  I've tried robutussin cough tabs it eventually soothes it but I think I need something stronger\"  Feels unable to take a deep breath          History of Present Illness       Pt presents with 2 week hx of persistent cough, mild congestion, now for the past 3 days the cough is worse at night, also has slight SOB/chest tightness. Denies fever, chills, ST, NVD. Symptoms are worse when supine. Has taken Robitussin.         Review of Systems   Review of Systems   Constitutional: Negative.    HENT:  Positive for congestion. Negative for sore throat.    Respiratory:  Positive for cough, chest tightness and shortness of breath.    Cardiovascular: Negative.    Gastrointestinal: Negative.    Genitourinary: Negative.          Current Medications       Current " Outpatient Medications:     azithromycin (ZITHROMAX) 250 mg tablet, Take 2 tablets today then 1 tablet daily x 4 days, Disp: 6 tablet, Rfl: 0    benzonatate (TESSALON) 200 MG capsule, Take 1 capsule (200 mg total) by mouth 3 (three) times a day as needed for cough, Disp: 20 capsule, Rfl: 0    losartan (COZAAR) 100 MG tablet, TAKE 1 TABLET DAILY, Disp: 90 tablet, Rfl: 3    metFORMIN (GLUCOPHAGE-XR) 500 mg 24 hr tablet, TAKE 2 TABLETS DAILY WITH BREAKFAST, Disp: 180 tablet, Rfl: 3    predniSONE 10 mg tablet, 6-5-4-3-2-1 taper with food., Disp: 21 tablet, Rfl: 0    rosuvastatin (CRESTOR) 5 mg tablet, TAKE 1 TABLET DAILY, Disp: 90 tablet, Rfl: 3    Blood Glucose Monitoring Suppl (OneTouch Verio Reflect) w/Device KIT, Check blood sugars once daily. Please substitute with appropriate alternative as covered by patient's insurance. Dx: E11.65 (Patient not taking: Reported on 10/20/2024), Disp: 1 kit, Rfl: 0    Continuous Blood Gluc Sensor (FreeStyle Sabine 3 Sensor) MISC, Use 1 each every 14 (fourteen) days (Patient not taking: Reported on 10/20/2024), Disp: 2 each, Rfl: 11    glucose blood (OneTouch Verio) test strip, Check blood sugars once daily. Please substitute with appropriate alternative as covered by patient's insurance. Dx: E11.65 (Patient not taking: Reported on 10/20/2024), Disp: 100 each, Rfl: 3    OneTouch Delica Lancets 33G MISC, Check blood sugars once daily. Please substitute with appropriate alternative as covered by patient's insurance. Dx: E11.65 (Patient not taking: Reported on 10/20/2024), Disp: 100 each, Rfl: 3    Current Allergies     Allergies as of 10/20/2024 - Reviewed 10/20/2024   Allergen Reaction Noted    Cefdinir Rash 12/06/2012            The following portions of the patient's history were reviewed and updated as appropriate: allergies, current medications, past family history, past medical history, past social history, past surgical history and problem list.     Past Medical History:    Diagnosis Date    Annual physical exam 05/15/2024    Hyperlipidemia     Hypertension        Past Surgical History:   Procedure Laterality Date    HIP SURGERY      JOINT REPLACEMENT  11/21       History reviewed. No pertinent family history.      Medications have been verified.        Objective   /88   Pulse 68   Temp 98.8 °F (37.1 °C) (Tympanic)   Resp 16   Wt 108 kg (237 lb)   SpO2 98%   BMI 31.27 kg/m²   No LMP for male patient.       Physical Exam     Physical Exam  Vitals reviewed.   Constitutional:       General: He is not in acute distress.     Appearance: He is well-developed.   HENT:      Right Ear: Hearing, tympanic membrane, ear canal and external ear normal.      Left Ear: Hearing, tympanic membrane, ear canal and external ear normal.      Nose: Mucosal edema (B/L boggy turbinates) and congestion present.      Mouth/Throat:      Mouth: Mucous membranes are moist.      Pharynx: Posterior oropharyngeal erythema (PND) present. No oropharyngeal exudate.      Tonsils: No tonsillar exudate.   Cardiovascular:      Rate and Rhythm: Normal rate and regular rhythm.      Heart sounds: Normal heart sounds. No murmur heard.  Pulmonary:      Effort: Pulmonary effort is normal. No respiratory distress.      Breath sounds: Rhonchi (B/L diffuse coarse decreased breath sounds heard throughout) present. No wheezing.   Musculoskeletal:      Cervical back: Neck supple.   Lymphadenopathy:      Cervical: No cervical adenopathy.   Neurological:      Mental Status: He is alert and oriented to person, place, and time.

## 2025-01-17 ENCOUNTER — RA CDI HCC (OUTPATIENT)
Dept: OTHER | Facility: HOSPITAL | Age: 55
End: 2025-01-17

## 2025-01-17 NOTE — PROGRESS NOTES
HCC coding opportunities          Chart Reviewed number of suggestions sent to Provider: 1   E11.36    This is a reminder to address (resolve/update/assess) ALL HCC (risk adjustment) codes as found on active problem list for 2025 as patient scores reset to zero LAURIE.  Patients Insurance        Commercial Insurance: [employer]

## 2025-01-22 ENCOUNTER — EVALUATION (OUTPATIENT)
Age: 55
End: 2025-01-22
Payer: COMMERCIAL

## 2025-01-22 ENCOUNTER — OFFICE VISIT (OUTPATIENT)
Age: 55
End: 2025-01-22

## 2025-01-22 VITALS
WEIGHT: 242.4 LBS | DIASTOLIC BLOOD PRESSURE: 78 MMHG | HEART RATE: 74 BPM | OXYGEN SATURATION: 96 % | BODY MASS INDEX: 32.13 KG/M2 | HEIGHT: 73 IN | TEMPERATURE: 98.4 F | SYSTOLIC BLOOD PRESSURE: 124 MMHG

## 2025-01-22 DIAGNOSIS — M25.512 ACUTE PAIN OF LEFT SHOULDER: ICD-10-CM

## 2025-01-22 DIAGNOSIS — E11.36 TYPE 2 DIABETES MELLITUS WITH DIABETIC CATARACT, WITHOUT LONG-TERM CURRENT USE OF INSULIN (HCC): Primary | ICD-10-CM

## 2025-01-22 DIAGNOSIS — E66.811 CLASS 1 OBESITY DUE TO EXCESS CALORIES WITHOUT SERIOUS COMORBIDITY WITH BODY MASS INDEX (BMI) OF 31.0 TO 31.9 IN ADULT: ICD-10-CM

## 2025-01-22 DIAGNOSIS — M25.512 ACUTE PAIN OF LEFT SHOULDER: Primary | ICD-10-CM

## 2025-01-22 DIAGNOSIS — Z12.5 SCREENING FOR PROSTATE CANCER: ICD-10-CM

## 2025-01-22 DIAGNOSIS — E66.09 CLASS 1 OBESITY DUE TO EXCESS CALORIES WITHOUT SERIOUS COMORBIDITY WITH BODY MASS INDEX (BMI) OF 31.0 TO 31.9 IN ADULT: ICD-10-CM

## 2025-01-22 LAB — SL AMB POCT HEMOGLOBIN AIC: 5.9 (ref ?–6.5)

## 2025-01-22 PROCEDURE — 97161 PT EVAL LOW COMPLEX 20 MIN: CPT | Performed by: PHYSICAL THERAPIST

## 2025-01-22 PROCEDURE — 99214 OFFICE O/P EST MOD 30 MIN: CPT | Performed by: NURSE PRACTITIONER

## 2025-01-22 PROCEDURE — 97110 THERAPEUTIC EXERCISES: CPT | Performed by: PHYSICAL THERAPIST

## 2025-01-22 PROCEDURE — 83036 HEMOGLOBIN GLYCOSYLATED A1C: CPT | Performed by: NURSE PRACTITIONER

## 2025-01-22 NOTE — ASSESSMENT & PLAN NOTE
Goal to consume 500 to 1,000 fewer calories per day for a 1-2 lbs weight loss per week. Increase exercise to 30 min, 5 times a week as tolerated for a goal of 150 mins a week. Calorie tracking apps such as myfitness pal can be helpful for keeping track of calorie intake. Decrease simple carbohydrates (white bread, pasta, white rice), and increasing vegetables, fruit, and protein.  Increase water.    Orders:  •  Comprehensive metabolic panel; Future  •  Microalbumin, Random Urine (W/Creatinine); Future  •  Lipid Panel with Direct LDL reflex; Future  •  PSA, total and free; Future  •  CBC and differential; Future

## 2025-01-22 NOTE — PATIENT INSTRUCTIONS
"Patient Education     Diabetes and diet   The Basics   Written by the doctors and editors at [facility]   Why is diet important if I have diabetes? -- Diet is important because it is part of diabetes treatment. Many people need to change what they eat and how much they eat to help treat their diabetes. It is important for people to treat their diabetes so they:   Keep their blood sugar at goal   Prevent long-term problems, such as heart or kidney problems, that can happen in people with diabetes  Changing your diet can also help treat obesity, high blood pressure, and high cholesterol. These conditions can affect people with diabetes and can lead to future problems, such as heart attacks or strokes.  Who will help me change my diet? -- Your doctor or nurse will work with you to make a food plan to change your diet. They might also recommend that you work with a dietitian. A dietitian is an expert on food and eating.  Do I need to eat at the same times every day? -- When and how often you should eat depends, in part, on the diabetes medicines you take. For example:   People who take about the same amount of insulin at the same time each day (called a \"fixed regimen\") should eat meals at the same times. This is also true for people who take pills that increase insulin levels, such as sulfonylureas. Eating meals at the same time every day helps prevent low blood sugar.   People who adjust the dose and timing of their insulin each day (called a \"flexible regimen\") do not always have to eat meals at the same time. That's because they can time their insulin dose for before they plan to eat, and also adjust the dose for how much they plan to eat.   People who take medicines that don't usually cause low blood sugar, such as metformin, don't have to eat meals at the same time every day.  What do I need to think about when planning what to eat? -- Our bodies break down the food we eat into small pieces called carbohydrates, " "proteins, and fats.  When planning what to eat, people with diabetes need to think about:   Carbohydrates (or \"carbs\") - These are sugars the body uses for energy. They can raise your blood sugar level. Your doctor, nurse, or dietitian will tell you how many carbs you should eat at each meal or snack. Foods that have carbs include:   Bread, pasta, and rice   Vegetables and fruits   Dairy foods   Foods and drinks with added sugar  It is best to get your carbs from fruits, vegetables, whole grains, and low-fat milk. It is best to avoid drinks with added sugar, like soda, juices, and sports drinks.   Protein - Your doctor, nurse, or dietitian will tell you how much protein you should eat each day. It is best to eat lean meats, fish, eggs, beans, peas, soy products, nuts, and seeds. Avoid or limit processed meats like johnson, hot dogs, and sausages.   Fats - The type of fat you eat is more important than the amount of fat. \"Saturated\" and \"trans\" fats can increase your risk for heart problems, like a heart attack.   Foods that have saturated fats include meat, butter, cheese, and ice cream.   Foods that have trans fats include processed food with \"partially hydrogenated oils\" on the ingredient list. This might include fried foods, store-bought cookies, muffins, pies, and cakes.  \"Monounsaturated\" and \"polyunsaturated\" fats are better for you. Foods with these types of fat include fish, avocado, olive oil, and nuts.   Calories - You need to eat a certain amount of calories each day to keep your weight the same. If you have excess body weight and want to lose weight, you need to eat fewer calories each day.   Fiber - Eating foods with a lot of fiber can help manage your blood sugar level. Foods that have a lot of fiber include apples, green beans, peas, beans, lentils, nuts, oatmeal, and whole grains.   Salt - People who have high blood pressure should not eat foods that contain a lot of salt (also called sodium). People " with high blood pressure should also eat healthy foods, such as fruits, vegetables, and low-fat dairy foods.   Alcohol and sugary drinks - Having more than 1 alcoholic drink (for females) or 2 drinks (for males) a day can raise blood sugar levels. Also, sugary drinks like fruit juice or soda can raise blood sugar levels.  How can I lose weight? -- You can:   Exercise - Try to get at least 30 minutes of physical activity a day, most days of the week. Even gentle exercise, like walking, is good for your health. Some people with diabetes need to change their medicine dose before they exercise. They might also need to check their blood sugar levels before and after exercising.   Eat fewer calories - Your doctor, nurse, or dietitian can tell you how many calories you should eat each day to lose weight.  If you are worried about your weight, size, or shape, talk with your doctor, nurse, or dietitian. They can help you make changes to improve your health.  Can I eat the same foods as my family? -- Yes. You do not need to eat special foods if you have diabetes. You and your family can eat the same foods. Changing your diet is mostly about eating healthy foods in healthy amounts.  What are the other parts of diabetes treatment? -- Besides changing your diet, the other parts of diabetes treatment are:   Exercise   Medicines  Some people with diabetes need to learn how to match their diet and exercise with their medicine dose. For example, people who use insulin might need to choose the dose of insulin they give themselves. To choose their dose, they need to think about:   What they plan to eat at the next meal   How much exercise they plan to do   What their blood sugar level is  If the diet and exercise do not match the medicine dose, a person's blood sugar level can get too low or too high. Blood sugar levels that are too low or too high can cause problems.  All topics are updated as new evidence becomes available and our  peer review process is complete.  This topic retrieved from [employer] on: Mar 27, 2024.  Topic 49580 Version 13.0  Release: 32.2.4 - C32.85  © 2024 UpToDate, Inc. and/or its affiliates. All rights reserved.  Consumer Information Use and Disclaimer   Disclaimer: This generalized information is a limited summary of diagnosis, treatment, and/or medication information. It is not meant to be comprehensive and should be used as a tool to help the user understand and/or assess potential diagnostic and treatment options. It does NOT include all information about conditions, treatments, medications, side effects, or risks that may apply to a specific patient. It is not intended to be medical advice or a substitute for the medical advice, diagnosis, or treatment of a health care provider based on the health care provider's examination and assessment of a patient's specific and unique circumstances. Patients must speak with a health care provider for complete information about their health, medical questions, and treatment options, including any risks or benefits regarding use of medications. This information does not endorse any treatments or medications as safe, effective, or approved for treating a specific patient. UpToDate, Inc. and its affiliates disclaim any warranty or liability relating to this information or the use thereof.The use of this information is governed by the Terms of Use, available at https://www.woltersBoingo Wirelessuwer.com/en/know/clinical-effectiveness-terms. 2024© UpToDate, Inc. and its affiliates and/or licensors. All rights reserved.  Copyright   © 2024 UpToDate, Inc. and/or its affiliates. All rights reserved.

## 2025-01-22 NOTE — ASSESSMENT & PLAN NOTE
Xray in August negative. Will refer to PT at this time.   Orders:  •  Ambulatory Referral to Physical Therapy; Future

## 2025-01-22 NOTE — ASSESSMENT & PLAN NOTE
Lab Results   Component Value Date    HGBA1C 5.9 01/22/2025     Orders:  •  POCT hemoglobin A1c  •  Comprehensive metabolic panel; Future  •  Microalbumin, Random Urine (W/Creatinine); Future  •  Lipid Panel with Direct LDL reflex; Future  •  PSA, total and free; Future  •  CBC and differential; Future

## 2025-01-22 NOTE — ASSESSMENT & PLAN NOTE
Well controlled with Metformin at this time. Pt UTD on DM eye exam and foot exam. Continue on diabetic diet with exercise goal of 150 mins a week.       Lab Results   Component Value Date    HGBA1C 5.9 01/22/2025     Orders:  •  POCT hemoglobin A1c  •  Comprehensive metabolic panel; Future  •  Microalbumin, Random Urine (W/Creatinine); Future  •  Lipid Panel with Direct LDL reflex; Future  •  PSA, total and free; Future  •  CBC and differential; Future        Lab Results   Component Value Date    HGBA1C 5.9 01/22/2025     Orders:  •  POCT hemoglobin A1c  •  Comprehensive metabolic panel; Future  •  Microalbumin, Random Urine (W/Creatinine); Future  •  Lipid Panel with Direct LDL reflex; Future  •  PSA, total and free; Future  •  CBC and differential; Future

## 2025-01-22 NOTE — PROGRESS NOTES
Name: Blake Ward      : 1970      MRN: [id number]  Encounter Provider: CHRISTOPHER Hayward  Encounter Date: 2025   Encounter department: [facility]  :  Assessment & Plan  Type 2 diabetes mellitus with diabetic cataract, without long-term current use of insulin (HCC)  Well controlled with Metformin at this time. Pt UTD on DM eye exam and foot exam. Continue on diabetic diet with exercise goal of 150 mins a week.       Lab Results   Component Value Date    HGBA1C 5.9 2025     Orders:  •  POCT hemoglobin A1c  •  Comprehensive metabolic panel; Future  •  Microalbumin, Random Urine (W/Creatinine); Future  •  Lipid Panel with Direct LDL reflex; Future  •  PSA, total and free; Future  •  CBC and differential; Future        Lab Results   Component Value Date    HGBA1C 5.9 2025     Orders:  •  POCT hemoglobin A1c  •  Comprehensive metabolic panel; Future  •  Microalbumin, Random Urine (W/Creatinine); Future  •  Lipid Panel with Direct LDL reflex; Future  •  PSA, total and free; Future  •  CBC and differential; Future    Class 1 obesity due to excess calories without serious comorbidity with body mass index (BMI) of 31.0 to 31.9 in adult  Goal to consume 500 to 1,000 fewer calories per day for a 1-2 lbs weight loss per week. Increase exercise to 30 min, 5 times a week as tolerated for a goal of 150 mins a week. Calorie tracking apps such as myfitness pal can be helpful for keeping track of calorie intake. Decrease simple carbohydrates (white bread, pasta, white rice), and increasing vegetables, fruit, and protein.  Increase water.    Orders:  •  Comprehensive metabolic panel; Future  •  Microalbumin, Random Urine (W/Creatinine); Future  •  Lipid Panel with Direct LDL reflex; Future  •  PSA, total and free; Future  •  CBC and differential; Future    Acute pain of left shoulder  Xray in August negative. Will refer to PT at this  "time.   Orders:  •  Ambulatory Referral to Physical Therapy; Future    Screening for prostate cancer    Orders:  •  PSA, total and free; Future        BMI Counseling: Body mass index is 31.98 kg/m². The BMI is above normal. Nutrition recommendations include decreasing portion sizes and encouraging healthy choices of fruits and vegetables. Exercise recommendations include moderate physical activity 150 minutes/week. Rationale for BMI follow-up plan is due to patient being overweight or obese.       History of Present Illness   Blake Ward is a 54 y.o. male who presents for follow-up of Type 2 diabetes mellitus.  Current symptoms/problems include none and have been stable.   Known diabetic complications: none  Cardiovascular risk factors: diabetes mellitus, dyslipidemia, hypertension, male gender, and obesity (BMI >= 30 kg/m2)  Current diabetic medications include Metformin.   Eye exam current (within one year): yes  Weight trend: fluctuating a bit  Prior visit with dietician: yes - CCN  Current diet: in general, a \"healthy\" diet    Current exercise: walking    Pt also reporting continued left shoulder pain since a fall in August 2024. Pt did have an x-ray performed at that time which was negative. He reports discomfort when raising left arm, lifting with left arm, and lowering left arm. Pain is described as a sharp sudden pain 5/10. He has not taken anything to help with pain.       Review of Systems   Constitutional: Negative.  Negative for chills and fever.   HENT: Negative.  Negative for ear pain and sore throat.    Eyes:  Negative for pain and visual disturbance.   Respiratory: Negative.  Negative for cough and shortness of breath.    Cardiovascular:  Negative for chest pain and palpitations.   Gastrointestinal:  Negative for abdominal pain and vomiting.   Genitourinary:  Negative for dysuria and hematuria.   Musculoskeletal:  Negative for arthralgias and back pain.        Left shoulder pain   Skin:  Negative for " "color change and rash.   Neurological: Negative.  Negative for seizures and syncope.   All other systems reviewed and are negative.      Objective   /78 (BP Location: Left arm, Patient Position: Sitting, Cuff Size: Large)   Pulse 74   Temp 98.4 °F (36.9 °C)   Ht 6' 1\" (1.854 m)   Wt 110 kg (242 lb 6.4 oz)   SpO2 96%   BMI 31.98 kg/m²      Physical Exam  Vitals and nursing note reviewed.   Constitutional:       General: He is not in acute distress.     Appearance: He is well-developed.   HENT:      Head: Normocephalic and atraumatic.   Eyes:      Conjunctiva/sclera: Conjunctivae normal.   Cardiovascular:      Rate and Rhythm: Normal rate and regular rhythm.      Heart sounds: No murmur heard.  Pulmonary:      Effort: Pulmonary effort is normal. No respiratory distress.      Breath sounds: Normal breath sounds.   Abdominal:      Palpations: Abdomen is soft.      Tenderness: There is no abdominal tenderness.   Musculoskeletal:         General: No swelling.      Left shoulder: No swelling, deformity, effusion, laceration, tenderness or bony tenderness. Normal range of motion.      Cervical back: Neck supple.   Skin:     General: Skin is warm and dry.      Capillary Refill: Capillary refill takes less than 2 seconds.   Neurological:      Mental Status: He is alert.   Psychiatric:         Mood and Affect: Mood normal.       Administrative Statements   I have spent a total time of 32 minutes in caring for this patient on the day of the visit/encounter including Risks and benefits of tx options, Instructions for management, Documenting in the medical record, Reviewing / ordering tests, medicine, procedures  , and Obtaining or reviewing history  .   "

## 2025-01-22 NOTE — PROGRESS NOTES
PT Evaluation     Today's date: 2025  Patient name: Blake Ward  : 1970  MRN: [id number]  Referring provider: Shraddha Zeng*  Dx:   Encounter Diagnosis     ICD-10-CM    1. Acute pain of left shoulder  M25.512 Ambulatory Referral to Physical Therapy          Start Time: 0800  Stop Time: 0845  Total time in clinic (min): 45 minutes    Assessment  Impairments: abnormal coordination, abnormal muscle firing, abnormal or restricted ROM, abnormal movement, activity intolerance, impaired balance, impaired physical strength, lacks appropriate home exercise program, pain with function, poor posture  and poor body mechanics  Functional limitations: lifting, OH activity, pushing, pulling,  Symptom irritability: moderate    Assessment details: Blake Ward is a 54 y.o. male presenting with subacute L shoulder pain consistent with minor supraspinatus lesion. Primary impairments include decreased ROM ,weakness, painful arc, motor control. Will benefit from skilled PT interventions for return to PLOF. HEP was provided and reviewed. Patient is able to complete HEP with good technique and appropriate pain response. Patient expressed understanding of appropriate dosage and frequency of HEP. No additional referral necessary.     Understanding of Dx/Px/POC: good     Prognosis: good    Goals    Short Term Goals:  In 4 weeks, the patient will:  1. SH AROM WNL  2. SH ABD Mmt to 4/5  3. Supervision with HEP for self care    Long Term Goals:  In 8 weeks, the patient will:  1. SH abd mmt to 5/5  2. FOTO to greater than predicted value  3. Independent with HEP for selfcare    Plan  Patient would benefit from: skilled physical therapy    Planned therapy interventions: joint mobilization, manual therapy, massage, [place] taping, neuromuscular re-education, patient education, postural training, self care, strengthening, stretching, therapeutic activities, therapeutic exercise, therapeutic training, graded exercise, graded  activity, home exercise program, flexibility, functional ROM exercises, balance and activity modification    Frequency: 2x week  Duration in weeks: 8  Treatment plan discussed with: patient      Subjective  Pain Location: L shoulder, has slowly improved.   Pain Intensity: 4/10  DOI: Fell in Sept on back, L shoulder pain,   Provoked by: dressing, OH, lifting above shoulder height, driving, eccentric control  Eased Positions: natural hx,   Living Situation:denies  Constitutional S/S: denies   Dominant Hand: Right  Goals: reduce pain, improve strength, avoid surgery    Objective  Red Flags: denies    Standing         Head Position x Protracted  Neutral  Retracted   Scapular Position x Protracted  Neutral  Retracted   Thoracic Spine x Inc Kyphosis  Neutral     Lumbar Spine  Inc Lordosis  Neutral x Dec Lordosis     Strength and ROM evaluated B from a regional biomechanical perspective and values relevant to this episode recorded in table below    ROM: Goniometric measurement revealed the following findings.  Shoulder ROM Right Left   Flexion 180 160 painful arc   Abduction 180 150 painful arc   ER WNL WNL   IR WNl L2          Strength: MMT revealed the following findings.  Joint Motion Right Left   Sh. Flexion 5/5 4/5   Sh. Abduction 5/5 3-/5   Sh. ER 5/5 4-/5   Sh. IR 5/5 4/5   Shoulder extension 5/5 4/5   Shoulder horizontal ABD 5/5 4/5          Additional Assessments:  Palpation: Crepitus with AROM elevation  Joint mobility: decreased inferior GHJ gliddes    Shoulder Specific Special Tests:                                                                                                                                Test / Measure  Left 1/22/2025   Ruddy n   Painful Arc p   Infraspinatus Strength Test n   Drop Arm n   Supraspinatus (empty can) n   Neer n   Sulcus  n   Biceps Load II n   Nish Relocation n   Clunk n          Precautions: Universal    Visit/Unit Tracking  AUTH Status:  Date 1/22      AMCHARBEL HARDY  Used 1       Remaining           Pertinent Findings:      POC End Date: 3/22                                                                                          Test / Measure  1/22   FOTO (Predicted )    SH abd mmt 3-/5   SH ABD ROM 150p arc           Manuals 1/22            MEET David             SH ROM                                       Neuro Re-Ed             Mikael KLINE             Wall slides             TB ER                                                                 Ther Ex             HEP 10'                                                                                                       Ther Activity                                       Gait Training                                       Modalities

## 2025-01-28 ENCOUNTER — OFFICE VISIT (OUTPATIENT)
Age: 55
End: 2025-01-28
Payer: COMMERCIAL

## 2025-01-28 DIAGNOSIS — M25.512 ACUTE PAIN OF LEFT SHOULDER: Primary | ICD-10-CM

## 2025-01-28 PROCEDURE — 97110 THERAPEUTIC EXERCISES: CPT | Performed by: PHYSICAL THERAPIST

## 2025-01-28 PROCEDURE — 97112 NEUROMUSCULAR REEDUCATION: CPT | Performed by: PHYSICAL THERAPIST

## 2025-01-28 PROCEDURE — 97140 MANUAL THERAPY 1/> REGIONS: CPT | Performed by: PHYSICAL THERAPIST

## 2025-01-28 NOTE — PROGRESS NOTES
"Daily Note     Today's date: 2025  Patient name: Blake Ward  : 1970  MRN: [id number]  Referring provider: Shraddha Zeng*  Dx:   Encounter Diagnosis     ICD-10-CM    1. Acute pain of left shoulder  M25.512           Start Time: 08  Stop Time: 0910  Total time in clinic (min): 45 minutes    Subjective: crepitus in SH with AAROM at home.     Objective: See treatment diary below    Assessment: Tolerated treatment well. Patient demonstrated fatigue post treatment, exhibited good technique with therapeutic exercises, and would benefit from continued PT 1:1 with Gregorio Parrish DPT for entirety of tx. Decreased crepitus post inferior glides, quickly fatigues with cuff strengthening.     Plan: Continue per plan of care.      Precautions: Universal    Visit/Unit Tracking  AUTH Status:  Date      AMA, BOMN Used 1 2      Remaining           Pertinent Findings:      POC End Date: 3/22                                                                                          Test / Measure     FOTO (Predicted )    SH abd mmt 3-/5   SH ABD ROM 150p arc           Manuals    GHJ Glides  KS   SH ROM  KS             Neuro Re-Ed     Cane AAROM  3x10   Wall slides  X10  2x8   TB ER     GHJ isometrics  5\"x10 ea   LPD unilateral  3x10 7#   Row unilateral  3x10 9#   Ball rolls with self op  3x10                        Ther Ex     HEP 10'    [place]   8'                                 Ther Activity               Gait Training               Modalities                    "

## 2025-01-30 ENCOUNTER — OFFICE VISIT (OUTPATIENT)
Age: 55
End: 2025-01-30
Payer: COMMERCIAL

## 2025-01-30 DIAGNOSIS — M25.512 ACUTE PAIN OF LEFT SHOULDER: Primary | ICD-10-CM

## 2025-01-30 PROCEDURE — 97140 MANUAL THERAPY 1/> REGIONS: CPT | Performed by: PHYSICAL THERAPIST

## 2025-01-30 PROCEDURE — 97110 THERAPEUTIC EXERCISES: CPT | Performed by: PHYSICAL THERAPIST

## 2025-01-30 PROCEDURE — 97112 NEUROMUSCULAR REEDUCATION: CPT | Performed by: PHYSICAL THERAPIST

## 2025-01-30 NOTE — PROGRESS NOTES
"Daily Note     Today's date: 2025  Patient name: Blake Ward  : 1970  MRN: [id number]  Referring provider: Shraddha Zeng*  Dx:   Encounter Diagnosis     ICD-10-CM    1. Acute pain of left shoulder  M25.512           Start Time: 838  Stop Time: 925  Total time in clinic (min): 47 minutes    Subjective: Feeling better since starting PT, improved dressing.     Objective: See treatment diary below    Assessment: Tolerated treatment well. Patient demonstrated fatigue post treatment, exhibited good technique with therapeutic exercises, and would benefit from continued PT 1:1 with Gregorio Parrish DPT for entirety of tx.     Plan: Continue per plan of care.      Precautions: Universal    Visit/Unit Tracking  AUTH Status:  Date     AMA, BOMN Used 1 2 3     Remaining           Pertinent Findings:      POC End Date: 3/22                                                                                          Test / Measure     FOTO (Predicted )    SH abd mmt 3-/5   SH ABD ROM 150p arc           Manuals    GHJ Glides  KS KS   SH ROM  KS KS               Neuro Re-Ed      Cane AAROM  3x10 3x10   Wall slides  X10  2x8    TB ER      GHJ isometrics  5\"x10 ea 5\"x10 ea   LPD unilateral  3x10 7# 3x10 7#   Row unilateral  3x10 9#    Ball rolls with self op  3x10  3x10    DB Stack LTR   X30 15#   Cane hinge press   x30   ER Walk out    X30 rtb   SH flexion to 90   3x10 1#   Ther Ex      HEP 10'     [place]   8' 8'   UBE   2/2                                 Ther Activity                  Gait Training                  Modalities                         "

## 2025-02-03 ENCOUNTER — OFFICE VISIT (OUTPATIENT)
Age: 55
End: 2025-02-03
Payer: COMMERCIAL

## 2025-02-03 DIAGNOSIS — M25.512 ACUTE PAIN OF LEFT SHOULDER: Primary | ICD-10-CM

## 2025-02-03 PROCEDURE — 97110 THERAPEUTIC EXERCISES: CPT | Performed by: PHYSICAL THERAPIST

## 2025-02-03 PROCEDURE — 97112 NEUROMUSCULAR REEDUCATION: CPT | Performed by: PHYSICAL THERAPIST

## 2025-02-03 PROCEDURE — 97140 MANUAL THERAPY 1/> REGIONS: CPT | Performed by: PHYSICAL THERAPIST

## 2025-02-03 NOTE — PROGRESS NOTES
"Daily Note     Today's date: 2/3/2025  Patient name: Blake Ward  : 1970  MRN: [id number]  Referring provider: Shraddha Zeng*  Dx:   Encounter Diagnosis     ICD-10-CM    1. Acute pain of left shoulder  M25.512           Start Time: 07  Stop Time: 0810  Total time in clinic (min): 45 minutes    Subjective: Felt good after last session,     Objective: See treatment diary below    Assessment: Tolerated treatment well. Patient demonstrated fatigue post treatment, exhibited good technique with therapeutic exercises, and would benefit from continued PT 1:1 with Gregorio Parrish DPT for entirety of tx. Decreased excessive scap elevation with SH AROM.     Plan: Continue per plan of care.      Precautions: Universal    Visit/Unit Tracking  AUTH Status:  Date  2/3   AMA, BOMN Used 1 2 3 4    Remaining           Pertinent Findings:      POC End Date: 3/22                                                                                          Test / Measure     FOTO (Predicted )    SH abd mmt 3-/5   SH ABD ROM 150p arc           Manuals  2/3   GHJ Glides  KS KS KS   SH ROM  KS KS KS                 Neuro Re-Ed       Cane AAROM  3x10 3x10 3x10 3#   Wall slides  X10  2x8     TB ER       GHJ isometrics  5\"x10 ea 5\"x10 ea    LPD unilateral  3x10 7# 3x10 7# 3x10 15# DL   Row unilateral  3x10 9#  3x10 18# DL   Ball rolls with self op  3x10  3x10     DB Stack LTR   X30 15# X30 15#   Cane hinge press   x30 x30   ER Walk out    X30 rtb X30 rtb   SH flexion to 90   3x10 1# 3x10 1#   Ther Ex       HEP 10'      [place]   8' 8' 8'   UBE   2/2 3'/3'                                      Ther Activity                     Gait Training                     Modalities                              "

## 2025-02-07 ENCOUNTER — OFFICE VISIT (OUTPATIENT)
Age: 55
End: 2025-02-07
Payer: COMMERCIAL

## 2025-02-07 DIAGNOSIS — M25.512 ACUTE PAIN OF LEFT SHOULDER: Primary | ICD-10-CM

## 2025-02-07 PROCEDURE — 97140 MANUAL THERAPY 1/> REGIONS: CPT | Performed by: PHYSICAL THERAPIST

## 2025-02-07 PROCEDURE — 97112 NEUROMUSCULAR REEDUCATION: CPT | Performed by: PHYSICAL THERAPIST

## 2025-02-07 PROCEDURE — 97110 THERAPEUTIC EXERCISES: CPT | Performed by: PHYSICAL THERAPIST

## 2025-02-07 NOTE — PROGRESS NOTES
"Daily Note     Today's date: 2025  Patient name: Blake Ward  : 1970  MRN: [id number]  Referring provider: Shraddha Zeng*  Dx:   Encounter Diagnosis     ICD-10-CM    1. Acute pain of left shoulder  M25.512                      Subjective: patient reports no issues after his last session. He has started doing some of the exercises at [employer].      Objective: See treatment diary below      Assessment: Tolerated treatment well. Continued with program as noted below. No reports of increased symptoms noted throughout the session. Continues to be challenged with LTR with 15# DB stack. Patient demonstrated fatigue post treatment, exhibited good technique with therapeutic exercises, and would benefit from continued PT      Plan: Continue per plan of care.      Precautions: Universal    Visit/Unit Tracking  AUTH Status:  Date 1/22 1/28 1/30 2/3 2   AMA, BOMN Used 1 2 3 4 5    Remaining            Pertinent Findings:      POC End Date: 3/22                                                                                          Test / Measure     FOTO (Predicted )    SH abd mmt 3-/5   SH ABD ROM 150p arc           Manuals  2/3 2/7   GHJ Glides  KS KS KS SK   SH ROM  KS KS KS SK                   Neuro Re-Ed        Cane AAROM  3x10 3x10 3x10 3# 3x10  3#   Wall slides  X10  2x8      TB ER        GHJ isometrics  5\"x10 ea 5\"x10 ea     LPD unilateral  3x10 7# 3x10 7# 3x10 15# DL 3x10   15#  DL   Row unilateral  3x10 9#  3x10 18# DL 3x10 18# DL    Ball rolls with self op  3x10  3x10      DB Stack LTR   X30 15# X30 15# X30 15#   Cane hinge press   x30 x30    ER Walk out    X30 rtb X30 rtb x30 rtb   SH flexion to 90   3x10 1# 3x10 1# 3x10 1#   Ther Ex        HEP 10'       [place]   8' 8' 8' 8'   UBE   2/2 3'/3' 3'/3'                                           Ther Activity                        Gait Training                        Modalities                                   "

## 2025-02-11 ENCOUNTER — APPOINTMENT (OUTPATIENT)
Age: 55
End: 2025-02-11
Payer: COMMERCIAL

## 2025-02-11 LAB
LEFT EYE DIABETIC RETINOPATHY: NORMAL
RIGHT EYE DIABETIC RETINOPATHY: NORMAL

## 2025-02-12 ENCOUNTER — OFFICE VISIT (OUTPATIENT)
Age: 55
End: 2025-02-12
Payer: COMMERCIAL

## 2025-02-12 DIAGNOSIS — M25.512 ACUTE PAIN OF LEFT SHOULDER: Primary | ICD-10-CM

## 2025-02-12 PROCEDURE — 97112 NEUROMUSCULAR REEDUCATION: CPT

## 2025-02-12 PROCEDURE — 97110 THERAPEUTIC EXERCISES: CPT

## 2025-02-12 PROCEDURE — 97530 THERAPEUTIC ACTIVITIES: CPT

## 2025-02-12 NOTE — PROGRESS NOTES
"Daily Note     Today's date: 2025  Patient name: Blake Ward  : 1970  MRN: [id number]  Referring provider: Shraddha Zeng*  Dx:   Encounter Diagnosis     ICD-10-CM    1. Acute pain of left shoulder  M25.512           Start Time: 0715  Stop Time: 0800  Total time in clinic (min): 45 minutes    Subjective: Pt reports improvement of L Sh status.  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        Objective: See treatment diary below      Assessment: Tolerated treatment well. Patient would benefit from continued PT.  Pt able to tolerate slight progression of planned therapeutic interventions this visit.  Had difficulty performing 90/90 carry secondary to ER and elevation weakness/discomfort.  Flexion, abduction, and ER are most limited planes of motion.  1:1 with Adriano Hughes DPT entirety of tx.        Plan: Continue per plan of care.  Progress treatment as tolerated.  Decrease frequency to 1x per week.       Precautions: Universal    Visit/Unit Tracking  AUTH Status:  Date  2/3 2/7 2/12   AMA, BOMN Used 1 2 3 4 5 6    Remaining             Pertinent Findings:      POC End Date: 3/22                                                                                          Test / Measure     FOTO (Predicted )    SH abd mmt 3-/5   SH ABD ROM 150p arc           Manuals  2/3 2/   GHJ Glides  [facility]    SH ROM  [facility]                      Neuro Re-Ed         Cane AAROM  3x10 3x10 3x10 3# 3x10  3# 3x10 4#   Wall slides  X10  2x8    3x10   TB ER         GHJ isometrics  5\"x10 ea 5\"x10 ea      LPD unilateral  3x10 7# 3x10 7# 3x10 15# DL 3x10   15#  DL 3x10 15# DL   Row unilateral  3x10 9#  3x10 18# DL 3x10 18# DL  3x10 19# DL   Ball rolls with self op  3x10  3x10       DB Stack LTR   X30 15# X30 15# X30 15# x30 15#   Cane hinge press   x30 x30  x30   ER Walk out    X30 rtb X30 rtb x30 rtb " x30 rtb   SH flexion to 90   3x10 1# 3x10 1# 3x10 1# 2x10 1#   SH scaption to 90      2x10 1#   Ther Ex         HEP 10'        [place]   8' 8' 8' 8' 4'/4'   UBE   2/2 3'/3' 3'/3' 3'/3'                                                Ther Activity         90/90 carry      4 laps 2#            Gait Training                           Modalities

## 2025-02-13 ENCOUNTER — APPOINTMENT (OUTPATIENT)
Age: 55
End: 2025-02-13
Payer: COMMERCIAL

## 2025-02-19 ENCOUNTER — OFFICE VISIT (OUTPATIENT)
Age: 55
End: 2025-02-19
Payer: COMMERCIAL

## 2025-02-19 DIAGNOSIS — M25.512 ACUTE PAIN OF LEFT SHOULDER: Primary | ICD-10-CM

## 2025-02-19 PROCEDURE — 97530 THERAPEUTIC ACTIVITIES: CPT | Performed by: PHYSICAL THERAPIST

## 2025-02-19 PROCEDURE — 97112 NEUROMUSCULAR REEDUCATION: CPT | Performed by: PHYSICAL THERAPIST

## 2025-02-19 PROCEDURE — 97110 THERAPEUTIC EXERCISES: CPT | Performed by: PHYSICAL THERAPIST

## 2025-02-19 NOTE — PROGRESS NOTES
"Daily Note     Today's date: 2025  Patient name: Blake Ward  : 1970  MRN: [id number]  Referring provider: Shraddha Zeng*  Dx:   Encounter Diagnosis     ICD-10-CM    1. Acute pain of left shoulder  M25.512           Start Time: 0740  Stop Time: 0825  Total time in clinic (min): 45 minutes    Subjective: Reports reduced pain and improved work tolerance.     Objective: See treatment diary below    Assessment: Tolerated treatment well. Patient demonstrated fatigue post treatment, exhibited good technique with therapeutic exercises, and would benefit from continued PT 1:1 with Gregorio Parrish DPT for entirety of tx. Continues to demonstrate reduced muscle activation with supraspinatus loading and external rotators.     Plan: Continue per plan of care.      Precautions: Universal    Visit/Unit Tracking  AUTH Status:  Date 1/22 1/28 1/30 2/3 2/7 2/12 2/19   AMA, BOMN Used 1 2 3 4 5 6 7    Remaining              Pertinent Findings:      POC End Date: 3/22                                                                                          Test / Measure     FOTO (Predicted )    SH abd mmt 3-/5   SH ABD ROM 150p arc           Manuals 1/22 1/28 1/30 2/3 2/7 2/12 2/19   GHJ Glides  KS KS KS SK  KS   SH ROM  KS KS KS SK  KS                       Neuro Re-Ed          Cane AAROM  3x10 3x10 3x10 3# 3x10  3# 3x10 4# 3x10 4#   Wall slides  X10  2x8    3x10 3x10 flexion  3x10 with pec OP   TB ER          GHJ isometrics  5\"x10 ea 5\"x10 ea       LPD unilateral  3x10 7# 3x10 7# 3x10 15# DL 3x10   15#  DL 3x10 15# DL X15 10#  X15   12#   Row unilateral  3x10 9#  3x10 18# DL 3x10 18# DL  3x10 19# DL 3x10 12#   Ball rolls with self op  3x10  3x10        DB Stack LTR   X30 15# X30 15# X30 15# x30 15# x30 15#   Cane hinge press   x30 x30  x30 x30   ER Walk out    X30 rtb X30 rtb x30 rtb x30 rtb    SH flexion to 90   3x10 1# 3x10 1# 3x10 1# 2x10 1# 2x10 2#   SH scaption to 90      2x10 1# 2x10 2#   Ther Ex   "        HEP 10'         [place]   8' 8' 8' 8' 4'/4' 4'/4'   UBE   2/2 3'/3' 3'/3' 3'/3' 3'/3'                                                     Ther Activity          90/90 carry      4 laps 2# 4 laps 2#             Gait Training                              Modalities

## 2025-02-26 ENCOUNTER — OFFICE VISIT (OUTPATIENT)
Age: 55
End: 2025-02-26
Payer: COMMERCIAL

## 2025-02-26 DIAGNOSIS — M25.512 ACUTE PAIN OF LEFT SHOULDER: Primary | ICD-10-CM

## 2025-02-26 PROCEDURE — 97530 THERAPEUTIC ACTIVITIES: CPT | Performed by: PHYSICAL THERAPIST

## 2025-02-26 PROCEDURE — 97112 NEUROMUSCULAR REEDUCATION: CPT | Performed by: PHYSICAL THERAPIST

## 2025-02-26 PROCEDURE — 97110 THERAPEUTIC EXERCISES: CPT | Performed by: PHYSICAL THERAPIST

## 2025-02-26 NOTE — PROGRESS NOTES
Daily Note     Today's date: 2025  Patient name: Blake Ward  : 1970  MRN: [id number]  Referring provider: Shraddha Zeng*  Dx:   Encounter Diagnosis     ICD-10-CM    1. Acute pain of left shoulder  M25.512           Start Time: 0725  Stop Time: 0810  Total time in clinic (min): 45 minutes    Subjective: Reports imorpved work activity tolerance and AROM.     Objective: See treatment diary below    Assessment: Tolerated treatment well. Patient demonstrated fatigue post treatment, exhibited good technique with therapeutic exercises, and would benefit from continued PT 1:1 with Gregorio Parrish DPT for entirety of tx. Added ball wall cw/ccw for improved endurance.     Plan: Continue per plan of care.      Precautions: Universal    Visit/Unit Tracking  AUTH Status:  Date  2/3 2/7 2/12 2/19 2/26   AMA, BOMN Used 1 2 3 4 5 6 7 8    Remaining               Pertinent Findings:      POC End Date: 3/22                                                                                          Test / Measure     FOTO (Predicted )    SH abd mmt 3-/5   SH ABD ROM 150p arc           Manuals 1/22 1/28 1/30 2/3 2/   GHJ Glides  KS KS KS SK  KS KS   SH ROM  KS KS KS SK  KS KS                         Neuro Re-Ed           Cane AAROM  3x10 3x10 3x10 3# 3x10  3# 3x10 4# 3x10 4# 3x10 4#   Wall slides  X10  2x8    3x10 3x10 flexion  3x10 with pec OP 3x10 flexion  3x10 with pec OP   SLER        3x10 2#   LPD unilateral  3x10 7# 3x10 7# 3x10 15# DL 3x10   15#  DL 3x10 15# DL X15 10#  X15   12# 3x10 15#   Row unilateral  3x10 9#  3x10 18# DL 3x10 18# DL  3x10 19# DL 3x10 12# 3x10 18#   Ball rolls with self op  3x10  3x10         DB Stack LTR   X30 15# X30 15# X30 15# x30 15# x30 15# x30 15#   ER Walk out    X30 rtb X30 rtb x30 rtb x30 rtb     SH flexion to 90   3x10 1# 3x10 1# 3x10 1# 2x10 1# 2x10 2# 2x10 2#   SH scaption to 90      2x10 1# 2x10 2# 2x10 2#   Ther Ex           HEP 10'           [place]   8' 8' 8' 8' 4'/4' 4'/4' 4'/4'   UBE   2/2 3'/3' 3'/3' 3'/3' 3'/3' 3'/3'                                                          Ther Activity           90/90 carry      4 laps 2# 4 laps 2# 4 laps 2#              Gait Training                                 Modalities

## 2025-03-05 ENCOUNTER — OFFICE VISIT (OUTPATIENT)
Age: 55
End: 2025-03-05
Payer: COMMERCIAL

## 2025-03-05 DIAGNOSIS — M25.512 ACUTE PAIN OF LEFT SHOULDER: Primary | ICD-10-CM

## 2025-03-05 PROCEDURE — 97112 NEUROMUSCULAR REEDUCATION: CPT

## 2025-03-05 PROCEDURE — 97530 THERAPEUTIC ACTIVITIES: CPT

## 2025-03-05 PROCEDURE — 97110 THERAPEUTIC EXERCISES: CPT

## 2025-03-05 NOTE — PROGRESS NOTES
"Daily Note     Today's date: 3/5/2025  Patient name: Blake Ward  : 1970  MRN: [id number]  Referring provider: Shraddha Zeng*  Dx:   Encounter Diagnosis     ICD-10-CM    1. Acute pain of left shoulder  M25.512           Start Time: 739  Stop Time: 818  Total time in clinic (min): 39 minutes    Subjective: Pt reports improvement of L Sh status.  Wearing wrist brace on R wrist at start of tx session.        Objective: See treatment diary below      Assessment: Tolerated treatment well. Patient demonstrated fatigue post treatment and would benefit from continued PT.  L Sh AROM in flexion and abduction is much improved with no discomfort, but he notes slight \"crunching\" persists.  UT compensatory patter persists with L Sh elevation.  Difficulty completing resisted ER secondary to weakness.  1:1 with Adriano Hughes DPT entirety of tx.        Plan: Continue per plan of care.      Precautions: Universal    Visit/Unit Tracking  AUTH Status:  Date 1/30 2/3 2/7 2/12 2/19 2/26 3/   AMA, BOMN Used 3 4 5 6 7 8 9    Remaining              Pertinent Findings:      POC End Date: 3/22                                                                                          Test / Measure     FOTO (Predicted )    SH abd mmt 3-/5   SH ABD ROM 150p arc           Manuals 1/28 1/30 2/3 2/7 2/12 2/19 2/26 3   GHJ Glides KS KS KS SK  KS KS    SH ROM KS KS KS SK  KS KS                          Neuro Re-Ed           Cane AAROM 3x10 3x10 3x10 3# 3x10  3# 3x10 4# 3x10 4# 3x10 4#    Wall slides X10  2x8    3x10 3x10 flexion  3x10 with pec OP 3x10 flexion  3x10 with pec OP 2x10 ea flex/ abd   SLER       3x10 2# [place]  3x10 2#   LPD unilateral 3x10 7# 3x10 7# 3x10 15# DL 3x10   15#  DL 3x10 15# DL X15 10#  X15   12# 3x10 15# 3x10 15#   Row unilateral 3x10 9#  3x10 18# DL 3x10 18# DL  3x10 19# DL 3x10 12# 3x10 18# 3x10 18#   Ball rolls with self op 3x10  3x10          DB Stack LTR  X30 15# X30 15# X30 15# x30 15# x30 15# " x30 15#    ER Walk out   X30 rtb X30 rtb x30 rtb x30 rtb      SH flex to 90  3x10 1# 3x10 1# 3x10 1# 2x10 1# 2x10 2# 2x10 2# 2x10 2#   SH scap to 90     2x10 1# 2x10 2# 2x10 2# 2x10 2#   SH abd to 90        2x10 2#   Medball 4 way against wall        2x10 ea 1kg   Ther Ex           HEP           [place]  8' 8' 8' 8' 4'/4' 4'/4' 4'/4' 3'/3'   UBE  2/2 3'/3' 3'/3' 3'/3' 3'/3' 3'/3' 4'/4'   [place] bicep/tricep w/ rope        3x10 18# ea                                               Ther Activity           90/90 carry     4 laps 2# 4 laps 2# 4 laps 2# 4 laps 2#   Overhead carry        4 laps 2#   Gait Training                                 Modalities

## 2025-03-12 ENCOUNTER — OFFICE VISIT (OUTPATIENT)
Age: 55
End: 2025-03-12
Payer: COMMERCIAL

## 2025-03-12 DIAGNOSIS — M25.512 ACUTE PAIN OF LEFT SHOULDER: Primary | ICD-10-CM

## 2025-03-12 PROCEDURE — 97112 NEUROMUSCULAR REEDUCATION: CPT | Performed by: PHYSICAL THERAPIST

## 2025-03-12 PROCEDURE — 97110 THERAPEUTIC EXERCISES: CPT | Performed by: PHYSICAL THERAPIST

## 2025-03-12 PROCEDURE — 97530 THERAPEUTIC ACTIVITIES: CPT | Performed by: PHYSICAL THERAPIST

## 2025-03-12 NOTE — PROGRESS NOTES
Daily Note     Today's date: 3/12/2025  Patient name: Blake Ward  : 1970  MRN: [id number]  Referring provider: Shraddha Zeng*  Dx:   Encounter Diagnosis     ICD-10-CM    1. Acute pain of left shoulder  M25.512           Start Time: 0800  Stop Time: 0839  Total time in clinic (min): 39 minutes    Subjective: Improved UE usage. Fatigues quickly.     Objective: See treatment diary below    Assessment: Tolerated treatment well. Patient demonstrated fatigue post treatment, exhibited good technique with therapeutic exercises, and would benefit from continued PT 1:1 with Gregorio Parrish DPT for entirety of tx. Progressing well, discussed timeline of RC recovery and considerations of advanced imaging. Pt agreeable to continue PT based on current progress.     Plan: Continue per plan of care.      Precautions: Universal    Visit/Unit Tracking  AUTH Status:  Date 1/30 2/3 2/7 2/12 2/19 2/26 3/5 3/12   AMA, BOMN Used 3 4 5 6 7 8 9 10    Remaining               Pertinent Findings:      POC End Date: 3/22                                                                                          Test / Measure     FOTO (Predicted )    SH abd mmt 3-/5   SH ABD ROM 150p arc           Manuals 1/28 1/30 2/3 2/7 2/12 2/19 2/26 3/5 3/12   GHJ Glides KS KS KS SK  KS KS  KS   SH ROM KS KS KS SK  KS KS  KS                           Neuro Re-Ed            Cane AAROM 3x10 3x10 3x10 3# 3x10  3# 3x10 4# 3x10 4# 3x10 4#     Wall slides X10  2x8    3x10 3x10 flexion  3x10 with pec OP 3x10 flexion  3x10 with pec OP 2x10 ea flex/ abd 2x10 ea flex/ abd   SLER       3x10 2# [place]  3x10 2# [place]  3x10 2#   LPD unilateral 3x10 7# 3x10 7# 3x10 15# DL 3x10   15#  DL 3x10 15# DL X15 10#  X15   12# 3x10 15# 3x10 15# 3x10 15#   Row unilateral 3x10 9#  3x10 18# DL 3x10 18# DL  3x10 19# DL 3x10 12# 3x10 18# 3x10 18# 3x10 18#   Ball rolls with self op 3x10  3x10           DB Stack LTR  X30 15# X30 15# X30 15# x30 15# x30 15# x30 15#      ER Walk out   X30 rtb X30 rtb x30 rtb x30 rtb       SH flex to 90  3x10 1# 3x10 1# 3x10 1# 2x10 1# 2x10 2# 2x10 2# 2x10 2# 2x10 2#   SH scap to 90     2x10 1# 2x10 2# 2x10 2# 2x10 2# 2x10 2#   SH abd to 90        2x10 2# 2x10 2#   Medball 4 way against wall        2x10 ea 1kg 2x10 ea 1kg   Ther Ex            HEP            [place]  8' 8' 8' 8' 4'/4' 4'/4' 4'/4' 3'/3' 3'/3'   UBE  2/2 3'/3' 3'/3' 3'/3' 3'/3' 3'/3' 4'/4' 4'/4'   [place] bicep/tricep w/ rope        3x10 18# ea 3x10 18# ea                                                   Ther Activity            90/90 carry     4 laps 2# 4 laps 2# 4 laps 2# 4 laps 2# 4 laps 2#   Overhead carry        4 laps 2# 4 laps 2#   Gait Training                                    Modalities

## 2025-03-19 ENCOUNTER — OFFICE VISIT (OUTPATIENT)
Age: 55
End: 2025-03-19
Payer: COMMERCIAL

## 2025-03-19 DIAGNOSIS — M25.512 ACUTE PAIN OF LEFT SHOULDER: Primary | ICD-10-CM

## 2025-03-19 PROCEDURE — 97530 THERAPEUTIC ACTIVITIES: CPT

## 2025-03-19 PROCEDURE — 97110 THERAPEUTIC EXERCISES: CPT

## 2025-03-19 PROCEDURE — 97112 NEUROMUSCULAR REEDUCATION: CPT

## 2025-03-19 NOTE — PROGRESS NOTES
Daily Note     Today's date: 3/19/2025  Patient name: Blake Ward  : 1970  MRN: [id number]  Referring provider: Shraddha Zeng*  Dx:   Encounter Diagnosis     ICD-10-CM    1. Acute pain of left shoulder  M25.512           Start Time: 08  Stop Time: 0848  Total time in clinic (min): 39 minutes    Subjective: Pt reports ADLs are getting much easier.  Decrease crunching in L Sh.        Objective: See treatment diary below      Assessment: Tolerated treatment well. Patient demonstrated fatigue post treatment and would benefit from continued PT.  Elevation and ER are most limited.  Significant difficulty with D2 flexion and Sh abduction this visit.  Discussed utilizing these movements at home frequently with no resistance to improve motor control.  1:1 with Adriano Hughes DPT entirety of tx.        Plan: Continue per plan of care.  Progress treatment as tolerated.       Precautions: Universal    Visit/Unit Tracking  AUTH Status:  Date 2/7 2/12 2/19 2/26 3/5 3/12 3/19   AMA, BOMN Used 5 6 7 8 9 10 11    Remaining              Pertinent Findings:      POC End Date: 3/22                                                                                          Test / Measure  1/22 3/19/2025   FOTO (Predicted 61) 32 66   SH abd mmt 3-/5    SH ABD ROM 150p arc             Manuals 2/3 2/7 2/12 2/19 2/26 3/5 3/12 3/19   GHJ Glides KS SK  KS KS  KS    SH ROM KS SK  KS KS  KS                          Neuro Re-Ed           Cane AAROM 3x10 3# 3x10  3# 3x10 4# 3x10 4# 3x10 4#      Wall slides   3x10 3x10 flexion  3x10 with pec OP 3x10 flexion  3x10 with pec OP 2x10 ea flex/ abd 2x10 ea flex/ abd 2x10 ea flex/ abd   SLER     3x10 2# [place]  3x10 2# [place]  3x10 2# [place]  3x10 2#   LPD unilateral 3x10 15# DL 3x10   15#  DL 3x10 15# DL X15 10#  X15   12# 3x10 15# 3x10 15# 3x10 15# 3x10 16# DL   Row unilateral 3x10 18# DL 3x10 18# DL  3x10 19# DL 3x10 12# 3x10 18# 3x10 18# 3x10 18# 3x10 20# DL   Ball rolls with self op            DB Stack LTR X30 15# X30 15# x30 15# x30 15# x30 15#      ER Walk out  X30 rtb x30 rtb x30 rtb        SH flex to 90 3x10 1# 3x10 1# 2x10 1# 2x10 2# 2x10 2# 2x10 2# 2x10 2# 2x10 2#   SH scap to 90   2x10 1# 2x10 2# 2x10 2# 2x10 2# 2x10 2# 2x10 2#   SH abd to 90      2x10 2# 2x10 2# 2x10 2#   Medball 4 way against wall      2x10 ea 1kg 2x10 ea 1kg    [place] Sh D1/D2 ext        2x10 ea 6#   [place] Sh D1/D2 flex        2x10 ea 4# D1 0# D2   Ther Ex           HEP           [place]  8' 8' 4'/4' 4'/4' 4'/4' 3'/3' 3'/3' 3'/3'   UBE 3'/3' 3'/3' 3'/3' 3'/3' 3'/3' 4'/4' 4'/4' 4'/4'   [place] bicep/tricep w/ rope      3x10 18# ea 3x10 18# ea 3x10 20# ea   Cane hinge press        3x10                                    Ther Activity           90/90 carry   4 laps 2# 4 laps 2# 4 laps 2# 4 laps 2# 4 laps 2# 4 laps 2#   Overhead carry      4 laps 2# 4 laps 2# 4 laps 2#   Gait Training                                 Modalities

## 2025-03-26 ENCOUNTER — EVALUATION (OUTPATIENT)
Age: 55
End: 2025-03-26
Payer: COMMERCIAL

## 2025-03-26 DIAGNOSIS — M25.512 ACUTE PAIN OF LEFT SHOULDER: Primary | ICD-10-CM

## 2025-03-26 PROCEDURE — 97164 PT RE-EVAL EST PLAN CARE: CPT

## 2025-03-26 PROCEDURE — 97112 NEUROMUSCULAR REEDUCATION: CPT

## 2025-03-26 PROCEDURE — 97530 THERAPEUTIC ACTIVITIES: CPT

## 2025-03-26 PROCEDURE — 97110 THERAPEUTIC EXERCISES: CPT

## 2025-03-26 NOTE — PROGRESS NOTES
Discharge Summary     Today's date: 3/26/2025  Patient name: Blake Ward  : 1970  MRN: [id number]  Referring provider: Shraddha Zeng*  Dx:   Encounter Diagnosis     ICD-10-CM    1. Acute pain of left shoulder  M25.512           Start Time: 0755  Stop Time: 0840  Total time in clinic (min): 45 minutes    Subjective: Pt reports current functional status is at 95%.  States compliance with HEP and utilizing techniques to better lift objects.  Biggest limitation is lifting overhead.  Pain intensity at worst has been 1-2/10.      Objective: See treatment diary below      Standing         Head Position x Protracted  Neutral  Retracted   Scapular Position x Protracted  Neutral  Retracted   Thoracic Spine x Inc Kyphosis  Neutral     Lumbar Spine  Inc Lordosis  Neutral x Dec Lordosis     Strength and ROM evaluated B from a regional biomechanical perspective and values relevant to this episode recorded in table below    ROM: Goniometric measurement revealed the following findings.  Shoulder ROM Right Left   Flexion 180 170   Abduction 180 165   ER WNL WNL   IR WNl T10          Strength: MMT revealed the following findings.  Joint Motion Right Left   Sh. Flexion 5/5 4+/5   Sh. Abduction 5/5 4+/5   Sh. ER 5/5 4+/5   Sh. IR 5/5 5/5   Shoulder extension 5/5 5/5   Shoulder horizontal ABD 5/5 5/5          Additional Assessments:  Palpation: Crepitus with AROM elevation  Joint mobility: decreased inferior GHJ gliddes    Shoulder Specific Special Tests:                                                                                                                                Test / Measure  Left    [place]Shankar n   Painful Arc n   Infraspinatus Strength Test n   Drop Arm n   Supraspinatus (empty can) n   Neer n   Sulcus  n   Biceps Load II n   Nish Relocation n   Clunk n       Assessment: Continued supraspinatus and infraspinatus weakness leading to most difficulty with overhead tasks and external rotation.  Pt  fully functional with work tasks and ADLs.  Tolerated treatment well. Patient demonstrated fatigue post treatment and exhibited good technique with therapeutic exercises.  1:1 with Adriano Hughes DPT entirety of tx.      Impairments: abnormal coordination, abnormal muscle firing, abnormal or restricted ROM, abnormal movement, activity intolerance, impaired balance, impaired physical strength, lacks appropriate home exercise program, pain with function, poor posture  and poor body mechanics  Functional limitations: lifting, OH activity, pushing, pulling,  Symptom irritability: moderate    Assessment details: Blake Ward is a 54 y.o. male presenting with subacute L shoulder pain consistent with minor supraspinatus lesion. Primary impairments include decreased ROM ,weakness, painful arc, motor control. Will benefit from skilled PT interventions for return to PLOF. HEP was provided and reviewed. Patient is able to complete HEP with good technique and appropriate pain response. Patient expressed understanding of appropriate dosage and frequency of HEP. No additional referral necessary.     Understanding of Dx/Px/POC: good     Prognosis: good    Goals    Short Term Goals:  In 4 weeks, the patient will:  1. SH AROM WNL - MET  2. SH ABD Mmt to 4/5 - MET  3. Supervision with HEP for self care - MET    Long Term Goals:  In 8 weeks, the patient will:  1. SH abd mmt to 5/5 - NOT MET  2. FOTO to greater than predicted value - MET  3. Independent with HEP for selfcare - MET     Plan: Discharge from skilled outpatient physical therapy at this time.  Pt would benefit from continuation of HEP.      Patient would benefit from: skilled physical therapy    Planned therapy interventions: joint mobilization, manual therapy, massage, [place] taping, neuromuscular re-education, patient education, postural training, self care, strengthening, stretching, therapeutic activities, therapeutic exercise, therapeutic training, graded exercise,  graded activity, home exercise program, flexibility, functional ROM exercises, balance and activity modification    Frequency: 2x week  Duration in weeks: 8  Treatment plan discussed with: patient     Precautions: Universal    Visit/Unit Tracking  AUTH Status:  Date 2/7 2/12 2/19 2/26 3/5 3/12 3/19 3/26   AMA, BOMN Used 5 6 7 8 9 10 11 12    Remaining               Pertinent Findings:      POC End Date: 3/22                                                                                          Test / Measure  1/22 3/19/2025   FOTO (Predicted 61) 32 66   SH abd mmt 3-/5    SH ABD ROM 150p arc             Manuals 2/7 2/12 2/19 2/26 3/5 3/12 3/19 3/26   GHJ Glides SK  KS KS  KS     SH ROM SK  KS KS  KS                           Neuro Re-Ed           Cane AAROM 3x10  3# 3x10 4# 3x10 4# 3x10 4#       Wall slides  3x10 3x10 flexion  3x10 with pec OP 3x10 flexion  3x10 with pec OP 2x10 ea flex/ abd 2x10 ea flex/ abd 2x10 ea flex/ abd 3x10 + lift off   SLER    3x10 2# [place]  3x10 2# [place]  3x10 2# [place]  3x10 2# [place]  3x10 3#   LPD unilateral 3x10   15#  DL 3x10 15# DL X15 10#  X15   12# 3x10 15# 3x10 15# 3x10 15# 3x10 16# DL    Row unilateral 3x10 18# DL  3x10 19# DL 3x10 12# 3x10 18# 3x10 18# 3x10 18# 3x10 20# DL Arce row 3x10 15#   Ball rolls with self op           DB Stack LTR X30 15# x30 15# x30 15# x30 15#       ER Walk out  x30 rtb x30 rtb         SH flex to 90 3x10 1# 2x10 1# 2x10 2# 2x10 2# 2x10 2# 2x10 2# 2x10 2# To/from abd 2x10 ea 2#   SH scap to 90  2x10 1# 2x10 2# 2x10 2# 2x10 2# 2x10 2# 2x10 2#    SH abd to 90     2x10 2# 2x10 2# 2x10 2#    Medball 4 way against wall     2x10 ea 1kg 2x10 ea 1kg     [place] Sh D1/D2 ext       2x10 ea 6# 2x10 ea 6#   [place] Sh D1/D2 flex       2x10 ea 4# D1 0# D2 2x10 ea 4# D1 0# D2   Ther Ex           HEP           [place]  8' 4'/4' 4'/4' 4'/4' 3'/3' 3'/3' 3'/3'    UBE 3'/3' 3'/3' 3'/3' 3'/3' 4'/4' 4'/4' 4'/4' 4'/4' L3   [place] bicep/tricep w/ rope     3x10 18# ea 3x10  18# ea 3x10 20# ea 3x10 20# ea   Cane hinge press       3x10                                     Ther Activity           90/90 carry  4 laps 2# 4 laps 2# 4 laps 2# 4 laps 2# 4 laps 2# 4 laps 2# 4 laps 2#   Overhead carry     4 laps 2# 4 laps 2# 4 laps 2# 4 laps 2#   Gait Training                                 Modalities

## 2025-05-02 ENCOUNTER — TELEPHONE (OUTPATIENT)
Dept: ADMINISTRATIVE | Facility: OTHER | Age: 55
End: 2025-05-02

## 2025-05-02 NOTE — TELEPHONE ENCOUNTER
----- Message from Na PAGE sent at 5/2/2025 10:56 AM EDT -----  Regarding: Care Gap Request  05/02/25 10:56 AM    Hello, our patient attached above has had Urine Albumin/Creatinine Ratio completed/performed. Please assist in updating the patient chart by pulling the document from labs Tab within Chart Review. The date of service is 1/22/25.     Thank you,  CHILANGO Rubio [facility]

## 2025-05-02 NOTE — TELEPHONE ENCOUNTER
Upon review of the In Basket request we have noted that under Lab Tab:Date 1/22/2025 is an active order - Future, because of this we are requesting that you forward this request/concern to the appropriate education email address.     Any additional questions or concerns should be emailed to the Practice Liaisons via the appropriate education email address, please do not reply via In Basket.    Thank you  Bridgette Jolly   [facility]

## 2025-05-27 ENCOUNTER — CONSULT (OUTPATIENT)
Age: 55
End: 2025-05-27

## 2025-05-27 VITALS
BODY MASS INDEX: 31.18 KG/M2 | SYSTOLIC BLOOD PRESSURE: 124 MMHG | HEIGHT: 74 IN | WEIGHT: 243 LBS | HEART RATE: 76 BPM | DIASTOLIC BLOOD PRESSURE: 84 MMHG

## 2025-05-27 DIAGNOSIS — E66.811 CLASS 1 OBESITY DUE TO EXCESS CALORIES WITH SERIOUS COMORBIDITY AND BODY MASS INDEX (BMI) OF 31.0 TO 31.9 IN ADULT: ICD-10-CM

## 2025-05-27 DIAGNOSIS — E11.36 TYPE 2 DIABETES MELLITUS WITH DIABETIC CATARACT, WITHOUT LONG-TERM CURRENT USE OF INSULIN (HCC): ICD-10-CM

## 2025-05-27 DIAGNOSIS — E66.09 CLASS 1 OBESITY DUE TO EXCESS CALORIES WITH SERIOUS COMORBIDITY AND BODY MASS INDEX (BMI) OF 31.0 TO 31.9 IN ADULT: ICD-10-CM

## 2025-05-27 DIAGNOSIS — Z01.818 PRE-OP EXAMINATION: Primary | ICD-10-CM

## 2025-05-27 DIAGNOSIS — Z12.5 SCREENING FOR PROSTATE CANCER: ICD-10-CM

## 2025-05-27 DIAGNOSIS — M19.131 SLAC (SCAPHOLUNATE ADVANCED COLLAPSE) OF WRIST, RIGHT: ICD-10-CM

## 2025-05-27 PROCEDURE — 93000 ELECTROCARDIOGRAM COMPLETE: CPT | Performed by: INTERNAL MEDICINE

## 2025-05-27 PROCEDURE — 99213 OFFICE O/P EST LOW 20 MIN: CPT | Performed by: INTERNAL MEDICINE

## 2025-05-27 NOTE — PATIENT INSTRUCTIONS
Pre-operative Medication Instructions    Avoid herbs or non-directed vitamins one week prior to surgery  Avoid aspirin containing medications or non-steroidal anti-inflammatory drugs one week preceding surgery  May take tylenol for pain up until the night before surgery    ACE Inhibitors or ARBs     Medication Name     losartan (COZAAR) 100 MG tablet      Continue this medication up to the evening before surgery/procedure, but do not take the morning of the day of surgery.    Cholesterol lowering meds     Medication Name     rosuvastatin (CRESTOR) 5 mg tablet      Continue to take this medication on your normal schedule.  If this is an oral medication and you take it in the morning, then you may take this medicine with a sip of water.    Diabetic Medications     Medication Name     metFORMIN (GLUCOPHAGE-XR) 500 mg 24 hr tablet        Medicine Instructions for Adults with Diabetes (NO Bowel Prep)    Follow these instructions when a BOWEL PREP is NOT required for your procedure or surgery!    NOTE:  GLP Agonists taken weekly: do not take in the 7 days before your procedure. **Bariatric surgery: do not take 4 weeks prior to your procedure.    SGLT-2 Inhibitors: do not take in the 4 days before your procedure    On the Day Before Surgery/Procedure  If you are having a procedure (e.g., Colonoscopy) or surgery which DOES NOT require a bowel prep, follow the directions below based on the type of medicine you take for your diabetes.  Type of Medicine You Take Examples What to Do   Pre-Mixed Insulin Intermediate  Rmeormc57/25, Pnpildq97/30, Novolog 70/30, Regular Insulin Take 1/2 your regular dose the evening before our procedure.   Rapid/Fast Acting  Insulin and/or Long-Acting Insulin Humalog U200, NovoLog, Apidra,  Lantus, Levemir, Tresiba, Toujeo,  Fias, Basaglar Take your FULL regular dose the day before procedure.   Oral Diabetic Medicines (sulfonylurea) Glipizide/Glimepiride/  Glucotrol Take your regular dose with  dinner the evening before your procedure.   Other Oral Diabetic Medicines Metformin, Glucophage, Glucophage  XR, Riomet, Glumetza, Actose,  Avandia, Gl set, Prandin Take your regular dose with dinner the evening before your procedure   GLP Agonists Adlyxin, Byetta, Bydureon,  Ozempic, Soliqua, Tanzeum,  Trulicity, Victoza, Saxenda,  Rybelsus, Wegovy, Mounjaro, Zepbound If taken daily, take as normal  If taken weekly, do not take this medicine for 7 days before your procedure including the day of the procedure (resume taking after the procedure). **Bariatric surgery: do not take 4 weeks prior to procedure   SGLT-2 Inhibitors Jardiance, Invokana, Farxiga, Steglatro, Brenzavvy, Qtern, Segluromet Glyxambi, Synjardy, Synjardy XR, Invokamet, InvokametXR, Trijary XR, Xigduo X Do not take for 4 days before your procedure including the day of the procedure (resume taking after the procedure)   This educational material has been approved by the Patient Education Advisory Committee.    On the Day of Surgery/Procedure  Follow the directions below based on the type of medicine you take for your diabetes.  Type of Medicine You Take  Examples What to Do   Long-Acting Insulin Lantus, Levemir, Tresiba,  Toujeo, Basaglar, Semglee If you normally take your Long Acting Insulin in the morning, take the full dose as scheduled.   GLP-I Agonists Adlyxin, Byetta, Bydureon,  Ozempic, Soliqua, Tanzeum,  Trulicity, Victoza, Saxenda,  Rybelsus, Mounjaro Do NOT take this medicine on the day of your procedure (resume taking after the procedure)   Except for the morning Long-Acting Insulin, DO NOT take ANY diabetic medicine on the day of your procedure unless you were instructed by the doctor who manages your diabetes medicines.  Continue to check your blood sugars.  If you have an insulin pump, ask your endocrinologist for instructions at least 3 days before your procedure. NOTE: If you are not able to ask your endocrinologist in advance, on the  day of the procedure set your insulin pump to your basal rate only. Bring your insulin pump supplies to the hospital.    If you have any questions about taking your diabetes medicines prior to your procedure, please contact the doctor who manages your diabetes medicines.

## 2025-05-27 NOTE — PROGRESS NOTES
Pre-operative Clearance  Name: Blake Ward      : 1970      MRN: [id number]  Encounter Provider: Uli Shipley MD  Encounter Date: 2025   Encounter department: [facility] IN PARTNERSHIP WITH [facility]    Assessment & Plan  1. Preoperative evaluation: Stable.  - Scheduled for right wrist ligament repair next Monday.  - Chronic right wrist pain likely due to physical job.  - Walks 7-8 miles daily at work, rides bicycle without chest pain or dyspnea.  - Previous hip replacement without anesthetic or bleeding complications.  - Low risk for cardiopulmonary complications from upcoming surgery.  - Advised to hold metformin and losartan on surgery morning.  - Rosuvastatin can be held or taken as preferred.  - Ordered fasting blood work for annual physical in a few weeks.    Follow-up  - Return in a few weeks for annual physical.    PROCEDURE  Previous hip replacement without anesthetic or bleeding complications.         Pre-operative Clearance:     Medication Instructions:   - ACE Inhibitors or ARBs: Continue this medication up to the evening before surgery/procedure, but do not take the morning of the day of surgery.  - Hyperlipidemia meds: Continue to take this medication on your normal schedule.      Medicine Instructions for Adults with Diabetes (NO Bowel Prep)    Follow these instructions when a BOWEL PREP is NOT required for your procedure or surgery!    NOTE:  GLP Agonists taken weekly: do not take in the 7 days before your procedure. **Bariatric surgery: do not take 4 weeks prior to your procedure.    SGLT-2 Inhibitors: do not take in the 4 days before your procedure    On the Day Before Surgery/Procedure  If you are having a procedure (e.g., Colonoscopy) or surgery which DOES NOT require a bowel prep, follow the directions below based on the type of medicine you take for your diabetes.  Type of Medicine You Take Examples What to Do   Pre-Mixed Insulin  Intermediate  Xhgagta76/25, Zukjpst32/30, Novolog 70/30, Regular Insulin Take 1/2 your regular dose the evening before our procedure.   Rapid/Fast Acting  Insulin and/or Long-Acting Insulin Humalog U200, NovoLog, Apidra,  Lantus, Levemir, Tresiba, Toujeo,  Fias, Basaglar Take your FULL regular dose the day before procedure.   Oral Diabetic Medicines (sulfonylurea) Glipizide/Glimepiride/  Glucotrol Take your regular dose with dinner the evening before your procedure.   Other Oral Diabetic Medicines Metformin, Glucophage, Glucophage  XR, Riomet, Glumetza, Actose,  Avandia, Gl set, Prandin Take your regular dose with dinner the evening before your procedure   GLP Agonists Adlyxin, Byetta, Bydureon,  Ozempic, Soliqua, Tanzeum,  Trulicity, Victoza, Saxenda,  Rybelsus, Wegovy, Mounjaro, Zepbound If taken daily, take as normal  If taken weekly, do not take this medicine for 7 days before your procedure including the day of the procedure (resume taking after the procedure). **Bariatric surgery: do not take 4 weeks prior to procedure   SGLT-2 Inhibitors Jardiance, Invokana, Farxiga, Steglatro, Brenzavvy, Qtern, Segluromet Glyxambi, Synjardy, Synjardy XR, Invokamet, InvokametXR, Trijary XR, Xigduo X Do not take for 4 days before your procedure including the day of the procedure (resume taking after the procedure)   This educational material has been approved by the Patient Education Advisory Committee.    On the Day of Surgery/Procedure  Follow the directions below based on the type of medicine you take for your diabetes.  Type of Medicine You Take  Examples What to Do   Long-Acting Insulin Lantus, Levemir, Tresiba,  Toujeo, Basaglar, Semglee If you normally take your Long Acting Insulin in the morning, take the full dose as scheduled.   GLP-I Agonists Adlyxin, Byetta, Bydureon,  Ozempic, Soliqua, Tanzeum,  Trulicity, Victoza, Saxenda,  Rybelsus, Mounjaro Do NOT take this medicine on the day of your procedure (resume taking  "after the procedure)   Except for the morning Long-Acting Insulin, DO NOT take ANY diabetic medicine on the day of your procedure unless you were instructed by the doctor who manages your diabetes medicines.  Continue to check your blood sugars.  If you have an insulin pump, ask your endocrinologist for instructions at least 3 days before your procedure. NOTE: If you are not able to ask your endocrinologist in advance, on the day of the procedure set your insulin pump to your basal rate only. Bring your insulin pump supplies to the hospital.         History of Present Illness     Pre-Op Examination       Pre-operative Risk Factors:    - History of cerebrovascular disease: No    - History of ischemic heart disease: No    - History of congestive heart failure: No    - Pre-operative treatment with insulin: No    - Pre-operative creatinine >2 mg/dL: No      History of Present Illness  The patient is a 55-year-old male presenting for preoperative evaluation.    Scheduled for right wrist scapholunate ligament repair next Monday. 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 Walks 7-8 miles daily at work, rides bicycle without chest pain or dyspnea. Previous hip replacement without anesthetic or bleeding complications. Concerned about long-term side effects of rosuvastatin and inquired about cardiac testing.     Review of Systems  Past Medical History   Past Medical History[1]  Past Surgical History[2]  Family History[3]  Social History[4]  Medications[5]  Allergies   Allergen Reactions    Cefdinir Rash     Objective   /84   Pulse 76   Ht 6' 2\" (1.88 m)   Wt 110 kg (243 lb)   BMI 31.20 kg/m²     Physical Exam  Constitutional:       General: He is not in acute distress.     Appearance: Normal appearance. He is well-developed. He is not ill-appearing.   HENT:      Mouth/Throat:      Comments: Mallampati I  Neck:      Vascular: No JVD. "     Cardiovascular:      Rate and Rhythm: Normal rate and regular rhythm.      Heart sounds: Normal heart sounds. No murmur heard.     No friction rub. No gallop.   Pulmonary:      Breath sounds: Normal breath sounds.   Abdominal:      General: Bowel sounds are normal. There is no distension.      Palpations: Abdomen is soft. There is no mass.     Musculoskeletal:         General: No swelling.     Neurological:      Mental Status: He is alert.           Uli Shipley MD         [1]   Past Medical History:  Diagnosis Date    Annual physical exam 05/15/2024    Hyperlipidemia     Hypertension    [2]   Past Surgical History:  Procedure Laterality Date    HIP SURGERY      JOINT REPLACEMENT  11/21   [3] No family history on file.  [4]   Social History  Tobacco Use    Smoking status: Never    Smokeless tobacco: Never   Vaping Use    Vaping status: Never Used   Substance and Sexual Activity    Alcohol use: Not Currently    Drug use: Never    Sexual activity: Yes     Partners: Female   [5]   Current Outpatient Medications on File Prior to Visit   Medication Sig    losartan (COZAAR) 100 MG tablet TAKE 1 TABLET DAILY    metFORMIN (GLUCOPHAGE-XR) 500 mg 24 hr tablet TAKE 2 TABLETS DAILY WITH BREAKFAST    rosuvastatin (CRESTOR) 5 mg tablet TAKE 1 TABLET DAILY    Continuous Blood Gluc Sensor (FreeStyle Sabine 3 Sensor) MISC Use 1 each every 14 (fourteen) days (Patient not taking: Reported on 10/20/2024)    glucose blood (OneTouch Verio) test strip Check blood sugars once daily. Please substitute with appropriate alternative as covered by patient's insurance. Dx: E11.65 (Patient not taking: Reported on 10/20/2024)    OneTouch Delica Lancets 33G MISC Check blood sugars once daily. Please substitute with appropriate alternative as covered by patient's insurance. Dx: E11.65 (Patient not taking: Reported on 10/20/2024)

## 2025-05-27 NOTE — ASSESSMENT & PLAN NOTE
Lab Results   Component Value Date    HGBA1C 5.9 01/22/2025       Orders:    Basic metabolic panel; Future    Hemoglobin A1C; Future    CBC and differential; Future    Lipid panel; Future    Albumin / creatinine urine ratio; Future

## 2025-06-03 DIAGNOSIS — E11.9 TYPE 2 DIABETES MELLITUS WITHOUT COMPLICATION, WITHOUT LONG-TERM CURRENT USE OF INSULIN (HCC): ICD-10-CM

## 2025-06-03 RX ORDER — METFORMIN HYDROCHLORIDE 500 MG/1
1000 TABLET, EXTENDED RELEASE ORAL
Qty: 180 TABLET | Refills: 3 | Status: SHIPPED | OUTPATIENT
Start: 2025-06-03

## 2025-06-09 RX ORDER — OXYCODONE HYDROCHLORIDE 5 MG/1
5 TABLET ORAL EVERY 4 HOURS PRN
COMMUNITY
Start: 2025-06-02

## 2025-06-11 ENCOUNTER — CLINICAL SUPPORT (OUTPATIENT)
Dept: FAMILY MEDICINE CLINIC | Facility: CLINIC | Age: 55
End: 2025-06-11

## 2025-06-11 DIAGNOSIS — Z12.5 SCREENING FOR PROSTATE CANCER: ICD-10-CM

## 2025-06-11 DIAGNOSIS — E11.36 TYPE 2 DIABETES MELLITUS WITH DIABETIC CATARACT, WITHOUT LONG-TERM CURRENT USE OF INSULIN (HCC): ICD-10-CM

## 2025-06-11 DIAGNOSIS — E66.09 CLASS 1 OBESITY DUE TO EXCESS CALORIES WITH SERIOUS COMORBIDITY AND BODY MASS INDEX (BMI) OF 31.0 TO 31.9 IN ADULT: ICD-10-CM

## 2025-06-11 DIAGNOSIS — E66.811 CLASS 1 OBESITY DUE TO EXCESS CALORIES WITH SERIOUS COMORBIDITY AND BODY MASS INDEX (BMI) OF 31.0 TO 31.9 IN ADULT: ICD-10-CM

## 2025-06-11 PROCEDURE — 36415 COLL VENOUS BLD VENIPUNCTURE: CPT | Performed by: INTERNAL MEDICINE

## 2025-06-12 ENCOUNTER — OFFICE VISIT (OUTPATIENT)
Age: 55
End: 2025-06-12

## 2025-06-12 VITALS
HEART RATE: 68 BPM | WEIGHT: 251 LBS | HEIGHT: 73 IN | SYSTOLIC BLOOD PRESSURE: 122 MMHG | DIASTOLIC BLOOD PRESSURE: 84 MMHG | BODY MASS INDEX: 33.27 KG/M2

## 2025-06-12 DIAGNOSIS — E66.09 CLASS 1 OBESITY DUE TO EXCESS CALORIES WITHOUT SERIOUS COMORBIDITY WITH BODY MASS INDEX (BMI) OF 31.0 TO 31.9 IN ADULT: ICD-10-CM

## 2025-06-12 DIAGNOSIS — E66.811 CLASS 1 OBESITY DUE TO EXCESS CALORIES WITHOUT SERIOUS COMORBIDITY WITH BODY MASS INDEX (BMI) OF 31.0 TO 31.9 IN ADULT: ICD-10-CM

## 2025-06-12 DIAGNOSIS — Z00.00 ANNUAL PHYSICAL EXAM: Primary | ICD-10-CM

## 2025-06-12 DIAGNOSIS — E11.36 TYPE 2 DIABETES MELLITUS WITH DIABETIC CATARACT, WITHOUT LONG-TERM CURRENT USE OF INSULIN (HCC): ICD-10-CM

## 2025-06-12 DIAGNOSIS — I10 ESSENTIAL HYPERTENSION: ICD-10-CM

## 2025-06-12 LAB
ALBUMIN SERPL-MCNC: 4.3 G/DL (ref 3.6–5.1)
ALBUMIN/CREAT UR: NORMAL MG/G CREAT
ALBUMIN/GLOB SERPL: 1.4 (CALC) (ref 1–2.5)
ALP SERPL-CCNC: 51 U/L (ref 35–144)
ALT SERPL-CCNC: 15 U/L (ref 9–46)
AST SERPL-CCNC: 14 U/L (ref 10–35)
BASOPHILS # BLD AUTO: 90 CELLS/UL (ref 0–200)
BASOPHILS NFR BLD AUTO: 1 %
BILIRUB DIRECT SERPL-MCNC: 0.1 MG/DL
BILIRUB INDIRECT SERPL-MCNC: 0.3 MG/DL (CALC) (ref 0.2–1.2)
BILIRUB SERPL-MCNC: 0.4 MG/DL (ref 0.2–1.2)
BUN SERPL-MCNC: 13 MG/DL (ref 7–25)
BUN/CREAT SERPL: 20 (CALC) (ref 6–22)
CALCIUM SERPL-MCNC: 9.4 MG/DL (ref 8.6–10.3)
CHLORIDE SERPL-SCNC: 102 MMOL/L (ref 98–110)
CHOLEST SERPL-MCNC: 179 MG/DL
CHOLEST/HDLC SERPL: 3.5 (CALC)
CO2 SERPL-SCNC: 28 MMOL/L (ref 20–32)
CREAT SERPL-MCNC: 0.64 MG/DL (ref 0.7–1.3)
CREAT UR-MCNC: 71 MG/DL (ref 20–320)
EOSINOPHIL # BLD AUTO: 171 CELLS/UL (ref 15–500)
EOSINOPHIL NFR BLD AUTO: 1.9 %
ERYTHROCYTE [DISTWIDTH] IN BLOOD BY AUTOMATED COUNT: 13 % (ref 11–15)
GFR/BSA.PRED SERPLBLD CYS-BASED-ARV: 112 ML/MIN/1.73M2
GLOBULIN SER CALC-MCNC: 3.1 G/DL (CALC) (ref 1.9–3.7)
GLUCOSE SERPL-MCNC: 105 MG/DL (ref 65–99)
HBA1C MFR BLD: 6.4 %
HCT VFR BLD AUTO: 47.4 % (ref 38.5–50)
HDLC SERPL-MCNC: 51 MG/DL
HGB BLD-MCNC: 15.1 G/DL (ref 13.2–17.1)
LDLC SERPL CALC-MCNC: 101 MG/DL (CALC)
LYMPHOCYTES # BLD AUTO: 2007 CELLS/UL (ref 850–3900)
LYMPHOCYTES NFR BLD AUTO: 22.3 %
MCH RBC QN AUTO: 29.7 PG (ref 27–33)
MCHC RBC AUTO-ENTMCNC: 31.9 G/DL (ref 32–36)
MCV RBC AUTO: 93.1 FL (ref 80–100)
MICROALBUMIN UR-MCNC: <0.2 MG/DL
MONOCYTES # BLD AUTO: 621 CELLS/UL (ref 200–950)
MONOCYTES NFR BLD AUTO: 6.9 %
NEUTROPHILS # BLD AUTO: 6111 CELLS/UL (ref 1500–7800)
NEUTROPHILS NFR BLD AUTO: 67.9 %
NONHDLC SERPL-MCNC: 128 MG/DL (CALC)
PLATELET # BLD AUTO: 231 THOUSAND/UL (ref 140–400)
PMV BLD REES-ECKER: 11.5 FL (ref 7.5–12.5)
POTASSIUM SERPL-SCNC: 4.7 MMOL/L (ref 3.5–5.3)
PROT SERPL-MCNC: 7.4 G/DL (ref 6.1–8.1)
PSA SERPL-MCNC: 0.32 NG/ML
RBC # BLD AUTO: 5.09 MILLION/UL (ref 4.2–5.8)
SL AMB POCT HEMOGLOBIN AIC: 6.2 (ref ?–6.5)
SODIUM SERPL-SCNC: 137 MMOL/L (ref 135–146)
TRIGL SERPL-MCNC: 173 MG/DL
WBC # BLD AUTO: 9 THOUSAND/UL (ref 3.8–10.8)

## 2025-06-12 PROCEDURE — 83036 HEMOGLOBIN GLYCOSYLATED A1C: CPT | Performed by: INTERNAL MEDICINE

## 2025-06-12 PROCEDURE — 99214 OFFICE O/P EST MOD 30 MIN: CPT | Performed by: INTERNAL MEDICINE

## 2025-06-12 PROCEDURE — 99396 PREV VISIT EST AGE 40-64: CPT | Performed by: INTERNAL MEDICINE

## 2025-06-12 NOTE — ASSESSMENT & PLAN NOTE
Chronic.  - Counseled on portion control and avoiding late-night snacking.  - Encouraged weight training 2-3 times per week.  - Recommended resuming weight management gracia.

## 2025-06-12 NOTE — PATIENT INSTRUCTIONS
"Patient Education     Routine physical for adults   The Basics   Written by the doctors and editors at [facility]   What is a physical? -- A physical is a routine visit, or \"check-up,\" with your doctor. You might also hear it called a \"wellness visit\" or \"preventive visit.\"  During each visit, the doctor will:   Ask about your physical and mental health   Ask about your habits, behaviors, and lifestyle   Do an exam   Give you vaccines if needed   Talk to you about any medicines you take   Give advice about your health   Answer your questions  Getting regular check-ups is an important part of taking care of your health. It can help your doctor find and treat any problems you have. But it's also important for preventing health problems.  A routine physical is different from a \"sick visit.\" A sick visit is when you see a doctor because of a health concern or problem. Since physicals are scheduled ahead of time, you can think about what you want to ask the doctor.  How often should I get a physical? -- It depends on your age and health. In general, for people age 21 years and older:   If you are younger than 50 years, you might be able to get a physical every 3 years.   If you are 50 years or older, your doctor might recommend a physical every year.  If you have an ongoing health condition, like diabetes or high blood pressure, your doctor will probably want to see you more often.  What happens during a physical? -- In general, each visit will include:   Physical exam - The doctor or nurse will check your height, weight, heart rate, and blood pressure. They will also look at your eyes and ears. They will ask about how you are feeling and whether you have any symptoms that bother you.   Medicines - It's a good idea to bring a list of all the medicines you take to each doctor visit. Your doctor will talk to you about your medicines and answer any questions. Tell them if you are having any side effects that bother you. You " "should also tell them if you are having trouble paying for any of your medicines.   Habits and behaviors - This includes:   Your diet   Your exercise habits   Whether you smoke, drink alcohol, or use drugs   Whether you are sexually active   Whether you feel safe at home  Your doctor will talk to you about things you can do to improve your health and lower your risk of health problems. They will also offer help and support. For example, if you want to quit smoking, they can give you advice and might prescribe medicines. If you want to improve your diet or get more physical activity, they can help you with this, too.   Lab tests, if needed - The tests you get will depend on your age and situation. For example, your doctor might want to check your:   Cholesterol   Blood sugar   Iron level   Vaccines - The recommended vaccines will depend on your age, health, and what vaccines you already had. Vaccines are very important because they can prevent certain serious or deadly infections.   Discussion of screening - \"Screening\" means checking for diseases or other health problems before they cause symptoms. Your doctor can recommend screening based on your age, risk, and preferences. This might include tests to check for:   Cancer, such as breast, prostate, cervical, ovarian, colorectal, prostate, lung, or skin cancer   Sexually transmitted infections, such as chlamydia and gonorrhea   Mental health conditions like depression and anxiety  Your doctor will talk to you about the different types of screening tests. They can help you decide which screenings to have. They can also explain what the results might mean.   Answering questions - The physical is a good time to ask the doctor or nurse questions about your health. If needed, they can refer you to other doctors or specialists, too.  Adults older than 65 years often need other care, too. As you get older, your doctor will talk to you about:   How to prevent falling at " home   Hearing or vision tests   Memory testing   How to take your medicines safely   Making sure that you have the help and support you need at home  All topics are updated as new evidence becomes available and our peer review process is complete.  This topic retrieved from [employer] on: May 02, 2024.  Topic 673831 Version 1.0  Release: 32.4.3 - C32.122  © 2024 UpToDate, Inc[employer] and/or its affiliates. All rights reserved.  Consumer Information Use and Disclaimer   Disclaimer: This generalized information is a limited summary of diagnosis, treatment, and/or medication information. It is not meant to be comprehensive and should be used as a tool to help the user understand and/or assess potential diagnostic and treatment options. It does NOT include all information about conditions, treatments, medications, side effects, or risks that may apply to a specific patient. It is not intended to be medical advice or a substitute for the medical advice, diagnosis, or treatment of a health care provider based on the health care provider's examination and assessment of a patient's specific and unique circumstances. Patients must speak with a health care provider for complete information about their health, medical questions, and treatment options, including any risks or benefits regarding use of medications. This information does not endorse any treatments or medications as safe, effective, or approved for treating a specific patient. UpToDate, Inc. and its affiliates disclaim any warranty or liability relating to this information or the use thereof.The use of this information is governed by the Terms of Use, available at https://www.woltersEyegrooveuwer.com/en/know/clinical-effectiveness-terms. 2024© UpToDate, Inc. and its affiliates and/or licensors. All rights reserved.  Copyright   © 2024 UpToDate, Inc. and/or its affiliates. All rights reserved.

## 2025-06-12 NOTE — PROGRESS NOTES
Adult Annual Physical  Name: Blake Ward      : 1970      MRN: [id number]  Encounter Provider: Uli Shipley MD  Encounter Date: 2025   Encounter department: [facility]    Assessment & Plan  1. Post-surgical swelling, right wrist: Acute.  - Advised elevation and mobility exercises to prevent scar tissue.  - Recommended wrist brace for support.    2. Obesity: Chronic.  - Counseled on portion control and avoiding late-night snacking.  - Encouraged weight training 2-3 times per week.  - Recommended resuming weight management gracia.    3. Health maintenance.  - Cologuard test valid until .  - Discussed Prevnar 20 and Shingrix vaccines.  - Recommended Prevnar 20 due to diabetes.  - Recommended Shingrix due to chickenpox history.  - He wants to think about it.    4. Hypertension: Stable.  - Blood pressure within normal range.  - On losartan 100 mg daily.  - Continued monitoring.    5. Hyperlipidemia.  - On rosuvastatin 5 mg daily.  - Cholesterol levels pending.  - Continued monitoring.    6. Diabetes mellitus: Well-managed.  - A1c 6.2%, increased from 5.9% in 2025.  - Fasting blood sugar 135 before surgery.  - On metformin 500 mg twice daily.  - Advised frequent blood sugar monitoring, especially post high-carb meals.    7. Interest in nattokinase supplements.  - Advised against nattokinase due to lack of scientific evidence.  - Discussed ineffectiveness of various supplements.    8. Dermatological concerns.  - Moles not concerning.  - Advised dermatologist follow-up.  - History of atypical nevus removal.    9. Varicose veins.  - Recommended compression socks.  - No pain reported.    Follow-up  - Follow-up in 3 months.           Immunizations:  - Immunizations due: Prevnar 20 and Zoster (Shingrix)         History of Present Illness       History of Present Illness  The patient is a 55-year-old male presenting for a physical  exam.    He reports tightness in his right wrist post-surgery, advised to maintain mobility to prevent scar tissue. Despite weakness, he retains some movement. He elevates the wrist and applies pressure to increase mobility. Swelling contributes to tightness. Alternates Tylenol and ibuprofen for pain and swelling, no stronger analgesics needed.    He is dissatisfied with his weight and plans to resume using the Lose It gracia. Weight fluctuated from 270 lbs to 229-225 lbs. Occasionally indulges in sweets, abstains from sugary sodas, recognizes excessive portion sizes at dinner, and eats after 6:00 PM. Bedtime is 9:30-10:00 PM. Snacks include popcorn and ice cream, attempts low-carb options. Regularly walks, uses treadmill and recumbent bike at the gym, enjoys biking.    No pneumonia vaccine since diabetes diagnosis. Severe chickenpox in childhood, curious about shingles vaccine. Considering colonoscopy in 2027, completed two Cologuard tests.    On losartan 100 mg daily for hypertension, rosuvastatin 5 mg daily for hyperlipidemia, metformin 500 mg twice daily with breakfast for diabetes. Missed medication yesterday before blood work at 10:40 AM, usually done a few days prior to visit.    Interested in nattokinase supplements for arterial plaque reduction.    History of mole removal, overdue for dermatologist follow-up.    Varicose veins in both legs, asymptomatic but cosmetically displeasing. Occupation involves prolonged standing, causing exhaustion.    Wears glasses, annual diabetic eye exams, new prescription not yet filled.    FAMILY HISTORY  Father has numerous moles.     Adult Annual Physical:  Patient presents for annual physical.     Diet and Physical Activity:  - Diet/Nutrition: well balanced diet.  - Exercise: walking.    Depression Screening:  - PHQ-2 Score: 0  - PHQ-9 Score: 0    General Health:  - Sleep: sleeps well.  - Hearing: normal hearing bilateral ears.  - Vision: wears glasses.  - Dental: regular  "dental visits.    Review of Systems      Objective   /84   Pulse 68   Ht 6' 1\" (1.854 m)   Wt 114 kg (251 lb)   BMI 33.12 kg/m²     Physical Exam    Cardiovascular:      Pulses: no weak pulses.           Dorsalis pedis pulses are 2+ on the right side and 2+ on the left side.        Posterior tibial pulses are 2+ on the right side and 2+ on the left side.   Feet:      Right foot:      Skin integrity: No ulcer, skin breakdown, erythema, warmth, callus or dry skin.      Left foot:      Skin integrity: No ulcer, skin breakdown, erythema, warmth, callus or dry skin.           Patient's shoes and socks removed.    Right Foot/Ankle   Right Foot Inspection  Skin Exam: skin normal and skin intact. No dry skin, no warmth, no callus, no erythema, no maceration, no abnormal color, no pre-ulcer, no ulcer and no callus.     Toe Exam: No swelling, no tenderness and  no right toe deformity    Sensory   Monofilament testing: intact    Vascular  The right DP pulse is 2+. The right PT pulse is 2+.     Left Foot/Ankle  Left Foot Inspection  Skin Exam: skin normal and skin intact. No dry skin, no warmth, no erythema, no maceration, normal color, no pre-ulcer, no ulcer and no callus.     Toe Exam: No swelling, no tenderness and no left toe deformity.     Sensory   Monofilament testing: intact    Vascular  The left DP pulse is 2+. The left PT pulse is 2+.     Assign Risk Category  No deformity present  No loss of protective sensation  No weak pulses  Risk: 0      "

## 2025-06-13 ENCOUNTER — RESULTS FOLLOW-UP (OUTPATIENT)
Dept: FAMILY MEDICINE CLINIC | Facility: CLINIC | Age: 55
End: 2025-06-13

## 2025-06-20 DIAGNOSIS — E78.2 MIXED HYPERLIPIDEMIA: ICD-10-CM

## 2025-06-23 RX ORDER — ROSUVASTATIN CALCIUM 5 MG/1
5 TABLET, COATED ORAL DAILY
Qty: 90 TABLET | Refills: 3 | Status: SHIPPED | OUTPATIENT
Start: 2025-06-23

## 2025-08-19 DIAGNOSIS — I10 ESSENTIAL HYPERTENSION: ICD-10-CM

## 2025-08-20 RX ORDER — LOSARTAN POTASSIUM 100 MG/1
100 TABLET ORAL DAILY
Qty: 90 TABLET | Refills: 3 | Status: SHIPPED | OUTPATIENT
Start: 2025-08-20